# Patient Record
Sex: FEMALE | Race: WHITE | NOT HISPANIC OR LATINO | Employment: FULL TIME | URBAN - METROPOLITAN AREA
[De-identification: names, ages, dates, MRNs, and addresses within clinical notes are randomized per-mention and may not be internally consistent; named-entity substitution may affect disease eponyms.]

---

## 2017-01-13 ENCOUNTER — OFFICE VISIT (OUTPATIENT)
Dept: FAMILY MEDICINE | Facility: CLINIC | Age: 37
End: 2017-01-13
Payer: COMMERCIAL

## 2017-01-13 VITALS
HEART RATE: 84 BPM | HEIGHT: 69 IN | BODY MASS INDEX: 21.27 KG/M2 | DIASTOLIC BLOOD PRESSURE: 58 MMHG | OXYGEN SATURATION: 98 % | TEMPERATURE: 98.2 F | WEIGHT: 143.6 LBS | SYSTOLIC BLOOD PRESSURE: 100 MMHG

## 2017-01-13 DIAGNOSIS — F41.9 ANXIETY: ICD-10-CM

## 2017-01-13 DIAGNOSIS — Z00.00 ENCOUNTER FOR ROUTINE ADULT HEALTH EXAMINATION WITHOUT ABNORMAL FINDINGS: Primary | ICD-10-CM

## 2017-01-13 DIAGNOSIS — Z30.431 SURVEILLANCE OF PREVIOUSLY PRESCRIBED INTRAUTERINE CONTRACEPTIVE DEVICE: ICD-10-CM

## 2017-01-13 LAB
MICRO REPORT STATUS: NORMAL
SPECIMEN SOURCE: NORMAL
WET PREP SPEC: NORMAL

## 2017-01-13 PROCEDURE — 99395 PREV VISIT EST AGE 18-39: CPT | Performed by: FAMILY MEDICINE

## 2017-01-13 PROCEDURE — 87591 N.GONORRHOEAE DNA AMP PROB: CPT | Performed by: FAMILY MEDICINE

## 2017-01-13 PROCEDURE — 87491 CHLMYD TRACH DNA AMP PROBE: CPT | Performed by: FAMILY MEDICINE

## 2017-01-13 PROCEDURE — 87210 SMEAR WET MOUNT SALINE/INK: CPT | Performed by: FAMILY MEDICINE

## 2017-01-13 NOTE — PATIENT INSTRUCTIONS
Resume Prozac     Use saline spray for nasal symptoms.     Follow up for fasting lab-only appointment.     Preventive Health Recommendations  Female Ages 26 - 39  Yearly exam:   See your health care provider every year in order to    Review health changes.     Discuss preventive care.      Review your medicines if you your doctor has prescribed any.    Until age 30: Get a Pap test every three years (more often if you have had an abnormal result).    After age 30: Talk to your doctor about whether you should have a Pap test every 3 years or have a Pap test with HPV screening every 5 years.   You do not need a Pap test if your uterus was removed (hysterectomy) and you have not had cancer.  You should be tested each year for STDs (sexually transmitted diseases), if you're at risk.   Talk to your provider about how often to have your cholesterol checked.  If you are at risk for diabetes, you should have a diabetes test (fasting glucose).  Shots: Get a flu shot each year. Get a tetanus shot every 10 years.   Nutrition:     Eat at least 5 servings of fruits and vegetables each day.    Eat whole-grain bread, whole-wheat pasta and brown rice instead of white grains and rice.    Talk to your provider about Calcium and Vitamin D.     Lifestyle    Exercise at least 150 minutes a week (30 minutes a day, 5 days of the week). This will help you control your weight and prevent disease.    Limit alcohol to one drink per day.    No smoking.     Wear sunscreen to prevent skin cancer.    See your dentist every six months for an exam and cleaning.

## 2017-01-13 NOTE — NURSING NOTE
"Chief Complaint   Patient presents with     Physical       Initial /58 mmHg  Pulse 84  Temp(Src) 98.2  F (36.8  C) (Oral)  Ht 1.74 m (5' 8.5\")  Wt 65.137 kg (143 lb 9.6 oz)  BMI 21.51 kg/m2  SpO2 98%  Breastfeeding? No Estimated body mass index is 21.51 kg/(m^2) as calculated from the following:    Height as of this encounter: 1.74 m (5' 8.5\").    Weight as of this encounter: 65.137 kg (143 lb 9.6 oz).  BP completed using cuff size: lizett Julio MA      "

## 2017-01-13 NOTE — MR AVS SNAPSHOT
After Visit Summary   1/13/2017    Ruiz Deng    MRN: [id number]           Patient Information     Date Of Birth          1980        Visit Information        Provider Department      1/13/2017 11:20 AM Lida Navarrete MD [facility]        Today's Diagnoses     Encounter for routine adult health examination without abnormal findings    -  1     IUD Surveillance         Anxiety           Care Instructions    Resume Prozac     Use saline spray for nasal symptoms.     Follow up for fasting lab-only appointment.     Preventive Health Recommendations  Female Ages 26 - 39  Yearly exam:   See your health care provider every year in order to    Review health changes.     Discuss preventive care.      Review your medicines if you your doctor has prescribed any.    Until age 30: Get a Pap test every three years (more often if you have had an abnormal result).    After age 30: Talk to your doctor about whether you should have a Pap test every 3 years or have a Pap test with HPV screening every 5 years.   You do not need a Pap test if your uterus was removed (hysterectomy) and you have not had cancer.  You should be tested each year for STDs (sexually transmitted diseases), if you're at risk.   Talk to your provider about how often to have your cholesterol checked.  If you are at risk for diabetes, you should have a diabetes test (fasting glucose).  Shots: Get a flu shot each year. Get a tetanus shot every 10 years.   Nutrition:     Eat at least 5 servings of fruits and vegetables each day.    Eat whole-grain bread, whole-wheat pasta and brown rice instead of white grains and rice.    Talk to your provider about Calcium and Vitamin D.     Lifestyle    Exercise at least 150 minutes a week (30 minutes a day, 5 days of the week). This will help you control your weight and prevent disease.    Limit alcohol to one drink per day.    No smoking.     Wear sunscreen to prevent skin  "cancer.    See your dentist every six months for an exam and cleaning.            Follow-ups after your visit        Future tests that were ordered for you today     Open Future Orders        Priority Expected Expires Ordered    HIV Antigen Antibody Combo Routine  7/13/2017 1/13/2017    Hepatitis C antibody Routine  7/13/2017 1/13/2017    Anti Treponema Routine  7/13/2017 1/13/2017    Lipid panel reflex to direct LDL Routine  7/13/2017 1/13/2017    Glucose Routine  7/13/2017 1/13/2017            Who to contact     If you have questions or need follow up information about today's clinic visit or your schedule please contact [facility] directly at [phone].  Normal or non-critical lab and imaging results will be communicated to you by [employer] [facility], letter or phone within 4 business days after the clinic has received the results. If you do not hear from us within 7 days, please contact the clinic through [employer] or phone. If you have a critical or abnormal lab result, we will notify you by phone as soon as possible.  Submit refill requests through [employer] or call your pharmacy and they will forward the refill request to us. Please allow 3 business days for your refill to be completed.          Additional Information About Your Visit        [facility] [facility] Information     [employer] gives you secure access to your electronic health record. If you see a primary care provider, you can also send messages to your care team and make appointments. If you have questions, please call your primary care clinic.  If you do not have a primary care provider, please call [phone] and they will assist you.        Care EveryWhere ID     This is your Care EveryWhere ID. This could be used by other organizations to access your [place] medical records  [id number]        Your Vitals Were     Pulse Temperature Height BMI (Body Mass Index) Pulse Oximetry Breastfeeding?    84 98.2  F (36.8  C) (Oral) 1.74 m (5' 8.5\") 21.51 kg/m2 " 98% No       Blood Pressure from Last 3 Encounters:   01/13/17 100/58   10/03/16 120/80   12/29/15 96/82    Weight from Last 3 Encounters:   01/13/17 65.137 kg (143 lb 9.6 oz)   10/03/16 70.58 kg (155 lb 9.6 oz)   12/29/15 67.994 kg (149 lb 14.4 oz)              We Performed the Following     Chlamydia trachomatis PCR     Neisseria gonorrhoeae PCR     Wet prep          Today's Medication Changes          These changes are accurate as of: 1/13/17 12:09 PM.  If you have any questions, ask your nurse or doctor.               Stop taking these medicines if you haven't already. Please contact your care team if you have questions.     albuterol 108 (90 BASE) MCG/ACT Inhaler   Commonly known as:  PROAIR HFA/PROVENTIL HFA/VENTOLIN HFA   Stopped by:  Lida Navarrete MD           azithromycin 250 MG tablet   Commonly known as:  ZITHROMAX   Stopped by:  Lida Navarrete MD                    Primary Care Provider Office Phone # Fax #    Lida Navarrete [phone]       [address] [address]        Thank you!     Thank you for choosing [facility]  for your care. Our goal is always to provide you with excellent care. Hearing back from our patients is one way we can continue to improve our services. Please take a few minutes to complete the written survey that you may receive in the mail after your visit with us. Thank you!             Your Updated Medication List - Protect others around you: Learn how to safely use, store and throw away your medicines at www.disposemymeds.org.          This list is accurate as of: 1/13/17 12:09 PM.  Always use your most recent med list.                   Brand Name Dispense Instructions for use    FLUoxetine 20 MG capsule    PROzac    90 capsule    TAKE 1 CAPSULE (20 MG) BY MOUTH DAILY       levonorgestrel 20 MCG/24HR IUD    MIRENA    1 each    1 each (20 mcg) by Intrauterine route once for  1 dose       LORazepam 1 MG tablet    ATIVAN    4 tablet    Take 0.5-1 tablets (0.5-1 mg) by mouth every 8 hours as needed for anxiety Take 30 minutes prior to departure.  Do not operate a vehicle after taking this medication

## 2017-01-13 NOTE — PROGRESS NOTES
SUBJECTIVE:     CC: Ruiz Deng is an 36 year old woman who presents for preventive health visit.     Healthy Habits:    Do you get at least three servings of calcium containing foods daily (dairy, green leafy vegetables, etc.)? yes    Amount of exercise or daily activities, outside of work: 7 day(s) per week    Problems taking medications regularly No    Medication side effects: No    Have you had an eye exam in the past two years? yes    Do you see a dentist twice per year? yes    Do you have sleep apnea, excessive snoring or daytime drowsiness?no          Today's PHQ-2 Score:   PHQ-2 ( 1999 Pf[employer]) 1/8/2017 12/29/2015   Q1: Little interest or pleasure in doing things - 0   Q2: Feeling down, depressed or hopeless - 0   PHQ-2 Score - 0   Little interest or pleasure in doing things Not at all -   Feeling down, depressed or hopeless Several days -   PHQ-2 Score 1 -       Abuse: Current or Past(Physical, Sexual or Emotional)- No  Do you feel safe in your environment - Yes    Social History   Substance Use Topics     Smoking status: Former Smoker     Smokeless tobacco: Never Used     Alcohol Use: Yes      Comment: 1 beer Q 2 weeks     The patient does not drink >3 drinks per day nor >7 drinks per week.    Recent Labs   Lab Test  12/12/12   0900   CHOL  177   HDL  69   LDL  98   TRIG  54   CHOLHDLRATIO  2.6       Reviewed orders with patient.  Reviewed health maintenance and updated orders accordingly - Yes    Ruiz d/c'd her Prozac a few months ago. She started working with a  in October and she states exercising with him helped and she felt she did not need it  She notes she is more irritable but when she was taking it, her irritability was improved. She is eating healthy and exercising frequently. She states she feels burnt out at work and is considering cutting back on her hours.     She states that she is no longer drinking as much. She states at her highest, she would feel bad about her drinking and  was drinking a bottle of wine and a few beers 4 days a week. She said she used it as coping mechanism but now she is using the gym as a coping mechanism.  Denies missing work, needing a drink first thing in the morning. However, she would drink more than 2 drinks at a time and feel guilty about her drinking. She states her  is helpful in keeping her accountable.     Additional Notes  -Her breathing issues and cold resolved. She states the inhaler was not helpful.   - She declines the flu vaccine.   -She notes that she has low back pain that she attributes to heavy lifting. She is stretching and using [employer] and might consider a chiropractor if this doesn't improve. She has discussed this with her  and they are working on stretches together.   Denies numbness, tingling, or pain going down to her legs.   -She is no longer doing fitness competitions.   -She saw there dermatologist in the summer and he removed her mole.   -She has not noticed her IUD coming out. She is no longer getting periods. She saw a gynecologist who switched out her IUD in summer 2015. She does not check her IUD string  -Ruiz got life insurance and she had her cholesterol checked last summer. It was normal.     Mammo Decision Support:  Mammogram not appropriate for this patient based on age.    Pertinent mammograms are reviewed under the imaging tab.  History of abnormal Pap smear:   Last 3 Pap Results:   PAP (no units)   Date Value   2015 NIL   2014 ASC-US*   2012 NIL     All Histories reviewed and updated in Epic.  Past Medical History   Diagnosis Date     Abnormal Pap smear 2010     ASCUS negative HPV.  repeat pap in 2011 is NIL and per protocol, patient may resume routine screening.      CARDIOVASCULAR SCREENING; LDL GOAL LESS THAN 160 10/31/2010     Anxiety 2012     ASCUS favoring benign 2010     neg HPV plan cotest in 3 yrs.      Obstetric History       T2      TAB0   SAB0   " E0   M0   L2       # Outcome Date GA Lbr Jim/2nd Weight Sex Delivery Anes PTL Lv   2 Term            1 Term                 Patient Active Problem List   Diagnosis     IUD Surveillance     CARDIOVASCULAR SCREENING; LDL GOAL LESS THAN 160     Skin blushing/flushing     Anxiety     Decreased libido     Past Surgical History   Procedure Laterality Date     C appendectomy  2006     open     Tonsillectomy  2002     Reconstruct breast, implant prosthesis, combined  2011     Surgical history of -   2014     mucocele removal       Social History   Substance Use Topics     Smoking status: Former Smoker     Smokeless tobacco: Never Used     Alcohol Use: Yes      Comment: 1 beer Q 2 weeks     Family History   Problem Relation Age of Onset     Asthma No family hx of      C.A.D. Paternal Grandfather 55      age 58 from 2nd MI     DIABETES Maternal Grandfather      Hypertension No family hx of      CEREBROVASCULAR DISEASE No family hx of      Breast Cancer No family hx of      Cancer - colorectal No family hx of      Prostate Cancer No family hx of      Alcoholism Father      Lung Cancer Paternal Grandmother 85     smoker         Current Outpatient Prescriptions   Medication Sig Dispense Refill     LORazepam (ATIVAN) 1 MG tablet Take 0.5-1 tablets (0.5-1 mg) by mouth every 8 hours as needed for anxiety Take 30 minutes prior to departure.  Do not operate a vehicle after taking this medication 4 tablet 0     FLUoxetine (PROZAC) 20 MG capsule TAKE 1 CAPSULE (20 MG) BY MOUTH DAILY 90 capsule 3     levonorgestrel (MIRENA) 20 MCG/24HR IUD 1 each (20 mcg) by Intrauterine route once for 1 dose 1 each 0     No Known Allergies      ROS:   ROS: 10 point ROS neg other than the symptoms noted above in the HPI.        OBJECTIVE:     /58 mmHg  Pulse 84  Temp(Src) 98.2  F (36.8  C) (Oral)  Ht 1.74 m (5' 8.5\")  Wt 65.137 kg (143 lb 9.6 oz)  BMI 21.51 kg/m2  SpO2 98%  Breastfeeding? No  EXAM:  GENERAL: healthy, alert and no " distress  EYES: Eyes grossly normal to inspection, PERRL and conjunctivae and sclerae normal  HENT: ear canals and TM's normal, nasal mucosal edema-right nostril, mouth without ulcers or lesions  NECK: no adenopathy, no asymmetry, masses, or scars and thyroid normal to palpation  RESP: lungs clear to auscultation - no rales, rhonchi or wheezes  BREAST: normal without masses, tenderness or nipple discharge and no palpable axillary masses or adenopathy  CV: regular rate and rhythm, normal S1 S2, no S3 or S4, no murmur, click or rub, no peripheral edema and peripheral pulses strong  ABDOMEN: soft, nontender, no hepatosplenomegaly, no masses and bowel sounds normal   (female): normal female external genitalia, normal urethral meatus, vaginal mucosa pink, moist, well rugated, and normal cervix/adnexa/uterus without masses or discharge. I'm unable to visualize the iud string.   MS: no gross musculoskeletal defects noted, no edema  SKIN: no suspicious lesions or rashes  NEURO: Normal strength and tone, mentation intact and speech normal  PSYCH: mentation appears normal, affect normal/bright    ASSESSMENT/PLAN:     1. Encounter for routine adult health examination without abnormal findings  Declined flu vaccine. Patient understands the risks of not vaccinating  - Wet prep  - Neisseria gonorrhoeae PCR  - Chlamydia trachomatis PCR  - HIV Antigen Antibody Combo; Future  - Hepatitis C antibody; Future  - Anti Treponema; Future  - Lipid panel reflex to direct LDL; Future  - Glucose; Future    2. IUD Surveillance  Not able to visualize iud string. Pt declines ultrasound/gyn referral to confirm presence. Will monitor.     3. Anxiety  Off Prozac. Pt is generally controlling with exercise but ongoing irritability that was better controlled with meds.  encouraged restarting Prozac for optimal control of sx's.   Reviewed onset of action of meds, common side effects and plan for close f/u. Encouraged call to clinic if symptoms  "worsening or adverse reactions.      COUNSELING:   Reviewed preventive health counseling, as reflected in patient instructions       Regular exercise       Healthy diet/nutrition       Vision screening       Hearing screening       Alcohol Use       Contraception         reports that she has quit smoking. She has never used smokeless tobacco.    Estimated body mass index is 21.51 kg/(m^2) as calculated from the following:    Height as of this encounter: 1.74 m (5' 8.5\").    Weight as of this encounter: 65.137 kg (143 lb 9.6 oz).     Patient Instructions   Resume Prozac     Use saline spray for nasal symptoms.     Follow up for fasting lab-only appointment.     Preventive Health Recommendations  Female Ages 26 - 39  Yearly exam:   See your health care provider every year in order to    Review health changes.     Discuss preventive care.      Review your medicines if you your doctor has prescribed any.    Until age 30: Get a Pap test every three years (more often if you have had an abnormal result).    After age 30: Talk to your doctor about whether you should have a Pap test every 3 years or have a Pap test with HPV screening every 5 years.   You do not need a Pap test if your uterus was removed (hysterectomy) and you have not had cancer.  You should be tested each year for STDs (sexually transmitted diseases), if you're at risk.   Talk to your provider about how often to have your cholesterol checked.  If you are at risk for diabetes, you should have a diabetes test (fasting glucose).  Shots: Get a flu shot each year. Get a tetanus shot every 10 years.   Nutrition:     Eat at least 5 servings of fruits and vegetables each day.    Eat whole-grain bread, whole-wheat pasta and brown rice instead of white grains and rice.    Talk to your provider about Calcium and Vitamin D.     Lifestyle    Exercise at least 150 minutes a week (30 minutes a day, 5 days of the week). This will help you control your weight and prevent " disease.    Limit alcohol to one drink per day.    No smoking.     Wear sunscreen to prevent skin cancer.    See your dentist every six months for an exam and cleaning.                Counseling Resources:  ATP IV Guidelines  Pooled Cohorts Equation Calculator  Breast Cancer Risk Calculator  FRAX Risk Assessment  ICSI Preventive Guidelines  Dietary Guidelines for Americans, 2010  USDA's MyPlate  ASA Prophylaxis  Lung CA Screening    This document serves as a record of the services and decisions personally performed and made by Lida Navarrete MD. It was created on her behalf by Haritha Whaley,a trained medical scribe. The creation of this document is based the provider's statements to the medical scribe.  Haritha Whaley January 13, 2017 11:37 AM     Lida Navarrete MD  [facility]  Answers for HPI/ROS submitted by the patient on 1/8/2017   Annual Exam:  Getting at least 3 servings of Calcium per day:: Yes  Bi-annual eye exam:: Yes  Dental care twice a year:: Yes  Sleep apnea or symptoms of sleep apnea:: None  Diet:: Regular (no restrictions)  Frequency of exercise:: 6-7 days/week  Taking medications regularly:: Yes  Medication side effects:: Not applicable  Additional concerns today:: No  PHQ-2 Depressed: Not at all, Several days  PHQ-2 Score: 1

## 2017-01-15 LAB
C TRACH DNA SPEC QL NAA+PROBE: NORMAL
N GONORRHOEA DNA SPEC QL NAA+PROBE: NORMAL
SPECIMEN SOURCE: NORMAL
SPECIMEN SOURCE: NORMAL

## 2017-01-17 DIAGNOSIS — Z00.00 ENCOUNTER FOR ROUTINE ADULT HEALTH EXAMINATION WITHOUT ABNORMAL FINDINGS: ICD-10-CM

## 2017-01-17 LAB
CHOLEST SERPL-MCNC: 152 MG/DL
GLUCOSE SERPL-MCNC: 83 MG/DL (ref 70–99)
HDLC SERPL-MCNC: 76 MG/DL
LDLC SERPL CALC-MCNC: 66 MG/DL
NONHDLC SERPL-MCNC: 76 MG/DL
T PALLIDUM IGG+IGM SER QL: NEGATIVE
TRIGL SERPL-MCNC: 48 MG/DL

## 2017-01-17 PROCEDURE — 82947 ASSAY GLUCOSE BLOOD QUANT: CPT | Performed by: FAMILY MEDICINE

## 2017-01-17 PROCEDURE — 80061 LIPID PANEL: CPT | Performed by: FAMILY MEDICINE

## 2017-01-17 PROCEDURE — 36415 COLL VENOUS BLD VENIPUNCTURE: CPT | Performed by: FAMILY MEDICINE

## 2017-01-17 PROCEDURE — 86803 HEPATITIS C AB TEST: CPT | Performed by: FAMILY MEDICINE

## 2017-01-17 PROCEDURE — 86780 TREPONEMA PALLIDUM: CPT | Performed by: FAMILY MEDICINE

## 2017-01-17 PROCEDURE — 87389 HIV-1 AG W/HIV-1&-2 AB AG IA: CPT | Performed by: FAMILY MEDICINE

## 2017-01-18 LAB
HCV AB SERPL QL IA: NORMAL
HIV 1+2 AB+HIV1 P24 AG SERPL QL IA: NORMAL

## 2017-06-03 ENCOUNTER — MYC MEDICAL ADVICE (OUTPATIENT)
Dept: FAMILY MEDICINE | Facility: CLINIC | Age: 37
End: 2017-06-03

## 2017-06-03 DIAGNOSIS — F40.243 FEAR OF FLYING: ICD-10-CM

## 2017-06-03 DIAGNOSIS — F41.9 ANXIETY: ICD-10-CM

## 2017-06-05 RX ORDER — LORAZEPAM 1 MG/1
0.5-1 TABLET ORAL EVERY 8 HOURS PRN
Qty: 6 TABLET | Refills: 0 | Status: SHIPPED | OUTPATIENT
Start: 2017-06-05 | End: 2018-03-08

## 2017-07-07 ENCOUNTER — MYC MEDICAL ADVICE (OUTPATIENT)
Dept: FAMILY MEDICINE | Facility: CLINIC | Age: 37
End: 2017-07-07

## 2017-07-07 DIAGNOSIS — F41.9 ANXIETY: ICD-10-CM

## 2017-07-07 NOTE — TELEPHONE ENCOUNTER
Prescription approved per [facility] Refill Protocol.  Ana Rosa Caputo RN.

## 2017-07-07 NOTE — TELEPHONE ENCOUNTER
FLUoxetine (PROZAC) 20 MG capsule     Last Written Prescription Date: 04/21/16  Last Fill Quantity: 90, # refills: 3  [place] Visit with [facility] [place]:  01/13/17 Dr. Navarrete        Last PHQ-9 score on record=   PHQ-9 SCORE 3/24/2015   Total Score 0

## 2017-12-07 ENCOUNTER — OFFICE VISIT (OUTPATIENT)
Dept: FAMILY MEDICINE | Facility: CLINIC | Age: 37
End: 2017-12-07
Payer: COMMERCIAL

## 2017-12-07 VITALS
DIASTOLIC BLOOD PRESSURE: 74 MMHG | TEMPERATURE: 98.5 F | HEART RATE: 64 BPM | WEIGHT: 151 LBS | BODY MASS INDEX: 22.88 KG/M2 | OXYGEN SATURATION: 99 % | SYSTOLIC BLOOD PRESSURE: 118 MMHG | HEIGHT: 68 IN

## 2017-12-07 DIAGNOSIS — Z00.00 ENCOUNTER FOR ROUTINE ADULT HEALTH EXAMINATION WITHOUT ABNORMAL FINDINGS: Primary | ICD-10-CM

## 2017-12-07 DIAGNOSIS — F41.9 ANXIETY: ICD-10-CM

## 2017-12-07 DIAGNOSIS — Z30.431 SURVEILLANCE OF PREVIOUSLY PRESCRIBED INTRAUTERINE CONTRACEPTIVE DEVICE: ICD-10-CM

## 2017-12-07 LAB
SPECIMEN SOURCE: NORMAL
WET PREP SPEC: NORMAL

## 2017-12-07 PROCEDURE — 99395 PREV VISIT EST AGE 18-39: CPT | Performed by: FAMILY MEDICINE

## 2017-12-07 PROCEDURE — 87491 CHLMYD TRACH DNA AMP PROBE: CPT | Performed by: FAMILY MEDICINE

## 2017-12-07 PROCEDURE — 87591 N.GONORRHOEAE DNA AMP PROB: CPT | Performed by: FAMILY MEDICINE

## 2017-12-07 PROCEDURE — 87210 SMEAR WET MOUNT SALINE/INK: CPT | Performed by: FAMILY MEDICINE

## 2017-12-07 ASSESSMENT — PAIN SCALES - GENERAL: PAINLEVEL: NO PAIN (0)

## 2017-12-07 NOTE — MR AVS SNAPSHOT
After Visit Summary   12/7/2017    Ruiz Deng    MRN: [id number]           Patient Information     Date Of Birth          1980        Visit Information        Provider Department      12/7/2017 2:40 PM Lida Navarrete MD [facility]        Today's Diagnoses     Encounter for routine adult health examination without abnormal findings    -  1    IUD Surveillance        Anxiety          Care Instructions    Med check in six months    Preventive Health Recommendations  Female Ages 26 - 39  Yearly exam:   See your health care provider every year in order to    Review health changes.     Discuss preventive care.      Review your medicines if you your doctor has prescribed any.    Until age 30: Get a Pap test every three years (more often if you have had an abnormal result).    After age 30: Talk to your doctor about whether you should have a Pap test every 3 years or have a Pap test with HPV screening every 5 years.   You do not need a Pap test if your uterus was removed (hysterectomy) and you have not had cancer.  You should be tested each year for STDs (sexually transmitted diseases), if you're at risk.   Talk to your provider about how often to have your cholesterol checked.  If you are at risk for diabetes, you should have a diabetes test (fasting glucose).  Shots: Get a flu shot each year. Get a tetanus shot every 10 years.   Nutrition:     Eat at least 5 servings of fruits and vegetables each day.    Eat whole-grain bread, whole-wheat pasta and brown rice instead of white grains and rice.    Talk to your provider about Calcium and Vitamin D.     Lifestyle    Exercise at least 150 minutes a week (30 minutes a day, 5 days of the week). This will help you control your weight and prevent disease.    Limit alcohol to one drink per day.    No smoking.     Wear sunscreen to prevent skin cancer.    See your dentist every six months for an exam and cleaning.             "Follow-ups after your visit        Who to contact     If you have questions or need follow up information about today's clinic visit or your schedule please contact [facility] directly at [phone].  Normal or non-critical lab and imaging results will be communicated to you by MyChart, letter or phone within 4 business days after the clinic has received the results. If you do not hear from us within 7 days, please contact the clinic through MyChart or phone. If you have a critical or abnormal lab result, we will notify you by phone as soon as possible.  Submit refill requests through [employer] or call your pharmacy and they will forward the refill request to us. Please allow 3 business days for your refill to be completed.          Additional Information About Your Visit        [facility] Information     [employer] gives you secure access to your electronic health record. If you see a primary care provider, you can also send messages to your care team and make appointments. If you have questions, please call your primary care clinic.  If you do not have a primary care provider, please call [phone] and they will assist you.        Care EveryWhere ID     This is your Care EveryWhere ID. This could be used by other organizations to access your [place] medical records  [id number]        Your Vitals Were     Pulse Temperature Height Pulse Oximetry Breastfeeding? BMI (Body Mass Index)    64 98.5  F (36.9  C) (Oral) 1.715 m (5' 7.5\") 99% No 23.3 kg/m2       Blood Pressure from Last 3 Encounters:   12/07/17 118/74   01/13/17 100/58   10/03/16 120/80    Weight from Last 3 Encounters:   12/07/17 68.5 kg (151 lb)   01/13/17 65.1 kg (143 lb 9.6 oz)   10/03/16 70.6 kg (155 lb 9.6 oz)              We Performed the Following     Chlamydia trachomatis PCR     Neisseria gonorrhoeae PCR     Wet prep          Today's Medication Changes          These changes are accurate as of: 12/7/17  3:29 PM.  If you have any " questions, ask your nurse or doctor.               These medicines have changed or have updated prescriptions.        Dose/Directions    FLUoxetine 20 MG capsule   Commonly known as:  PROzac   This may have changed:  See the new instructions.   Used for:  Anxiety   Changed by:  Lida Navarrete MD        TAKE 1 CAP BY MOUTH DAILY   Quantity:  90 capsule   Refills:  3            Where to get your medicines      These medications were sent to [facility]/pharmacy #[id number] - [place], [place] - [address] [place] [facility]  [address] [facility] [address]     Phone:  [phone]     FLUoxetine 20 MG capsule                Primary Care Provider Office Phone # Fax #    Lida Navarrete [phone] [address] [address]        Equal Access to Services     [facility]: Hadii aad ku hadasho Soandrea, waaxda luqadaha, qaybta kaalmada ademaiteyaamaris, morgan barone . So [facility] [phone].    ATENCIÓN: Si habla español, tiene a gutierrez disposición servicios gratuitos de asistencia lingüística. [facility] [phone].    We comply with applicable federal civil rights laws and [place] laws. We do not discriminate on the basis of race, color, national origin, age, disability, sex, sexual orientation, or gender identity.            Thank you!     Thank you for choosing [facility]  for your care. Our goal is always to provide you with excellent care. Hearing back from our patients is one way we can continue to improve our services. Please take a few minutes to complete the written survey that you may receive in the mail after your visit with us. Thank you!             Your Updated Medication List - Protect others around you: Learn how to safely use, store and throw away your medicines at www.disposemymeds.org.          This list is accurate as of: 12/7/17  3:29 PM.  Always use your most recent med list.                    Brand Name Dispense Instructions for use Diagnosis    FLUoxetine 20 MG capsule    PROzac    90 capsule    TAKE 1 CAP BY MOUTH DAILY    Anxiety       levonorgestrel 20 MCG/24HR IUD    MIRENA    1 each    1 each (20 mcg) by Intrauterine route once for 1 dose    Encounter for IUD insertion       LORazepam 1 MG tablet    ATIVAN    6 tablet    Take 0.5-1 tablets (0.5-1 mg) by mouth every 8 hours as needed for anxiety Take 30 minutes prior to departure.  Do not operate a vehicle after taking this medication    Fear of flying, Anxiety

## 2017-12-07 NOTE — PATIENT INSTRUCTIONS
Med check in six months    Preventive Health Recommendations  Female Ages 26 - 39  Yearly exam:   See your health care provider every year in order to    Review health changes.     Discuss preventive care.      Review your medicines if you your doctor has prescribed any.    Until age 30: Get a Pap test every three years (more often if you have had an abnormal result).    After age 30: Talk to your doctor about whether you should have a Pap test every 3 years or have a Pap test with HPV screening every 5 years.   You do not need a Pap test if your uterus was removed (hysterectomy) and you have not had cancer.  You should be tested each year for STDs (sexually transmitted diseases), if you're at risk.   Talk to your provider about how often to have your cholesterol checked.  If you are at risk for diabetes, you should have a diabetes test (fasting glucose).  Shots: Get a flu shot each year. Get a tetanus shot every 10 years.   Nutrition:     Eat at least 5 servings of fruits and vegetables each day.    Eat whole-grain bread, whole-wheat pasta and brown rice instead of white grains and rice.    Talk to your provider about Calcium and Vitamin D.     Lifestyle    Exercise at least 150 minutes a week (30 minutes a day, 5 days of the week). This will help you control your weight and prevent disease.    Limit alcohol to one drink per day.    No smoking.     Wear sunscreen to prevent skin cancer.    See your dentist every six months for an exam and cleaning.

## 2017-12-07 NOTE — NURSING NOTE
"Chief Complaint   Patient presents with     Physical       Initial /74  Pulse 64  Temp 98.5  F (36.9  C) (Oral)  Ht 1.715 m (5' 7.5\")  Wt 68.5 kg (151 lb)  SpO2 99%  Breastfeeding? No  BMI 23.3 kg/m2 Estimated body mass index is 23.3 kg/(m^2) as calculated from the following:    Height as of this encounter: 1.715 m (5' 7.5\").    Weight as of this encounter: 68.5 kg (151 lb).  Medication Reconciliation: complete   Jan SANTIAGO        "

## 2017-12-07 NOTE — PROGRESS NOTES
SUBJECTIVE:   CC: Ruiz Deng is an 37 year old woman who presents for preventive health visit.     Healthy Habits:    Do you get at least three servings of calcium containing foods daily (dairy, green leafy vegetables, etc.)? yes    Amount of exercise or daily activities, outside of work: 3-4 day(s) per week    Problems taking medications regularly No    Medication side effects: No    Have you had an eye exam in the past two years? yes    Do you see a dentist twice per year? yes    Do you have sleep apnea, excessive snoring or daytime drowsiness?no    Mood: controlled with Prozac. She d/c'ed this summer and had mood sx's return so she restarted Prozac.     IUD: insertion date was June 2015. She is happy with this. No regular menses with iud in place     Weight: pt was down to 135 lbs when working with a . She is nervous about her weight. She is eating lots of fruits and veggies and protein.   Wt Readings from Last 5 Encounters:   12/07/17 68.5 kg (151 lb)   01/13/17 65.1 kg (143 lb 9.6 oz)   10/03/16 70.6 kg (155 lb 9.6 oz)   12/29/15 68 kg (149 lb 14.4 oz)   06/19/15 67.8 kg (149 lb 6.4 oz)     Pt's dad has hx of pancreatic cancer. Cancer has spread other organs and was told he has 6-8 months to live.     BP: no concerns.  BP Readings from Last 3 Encounters:   12/07/17 118/74   01/13/17 100/58   10/03/16 120/80         Today's PHQ-2 Score:   PHQ-2 ( 1999 [employer]) 1/8/2017 12/29/2015   Q1: Little interest or pleasure in doing things - 0   Q2: Feeling down, depressed or hopeless - 0   PHQ-2 Score - 0   Q1: Little interest or pleasure in doing things Not at all -   Q2: Feeling down, depressed or hopeless Several days -   PHQ-2 Score 1 -         Abuse: Current or Past(Physical, Sexual or Emotional)- No  Do you feel safe in your environment - Yes  Social History   Substance Use Topics     Smoking status: Former Smoker     Smokeless tobacco: Never Used     Alcohol use Yes      Comment: 2-3 drinks Q  weeks      The patient does not drink >3 drinks per day nor >7 drinks per week.    Reviewed orders with patient.  Reviewed health maintenance and updated orders accordingly - Yes  Labs reviewed in EPIC  BP Readings from Last 3 Encounters:   17 118/74   17 100/58   10/03/16 120/80    Wt Readings from Last 3 Encounters:   17 68.5 kg (151 lb)   17 65.1 kg (143 lb 9.6 oz)   10/03/16 70.6 kg (155 lb 9.6 oz)                  Patient Active Problem List   Diagnosis     IUD Surveillance     CARDIOVASCULAR SCREENING; LDL GOAL LESS THAN 160     Skin blushing/flushing     Anxiety     Decreased libido     Past Surgical History:   Procedure Laterality Date     C APPENDECTOMY  2006    open     RECONSTRUCT BREAST, IMPLANT PROSTHESIS, COMBINED  2011     SURGICAL HISTORY OF -   2014    mucocele removal     TONSILLECTOMY         Social History   Substance Use Topics     Smoking status: Former Smoker     Smokeless tobacco: Never Used     Alcohol use Yes      Comment: 2-3 drinks Q  weeks     Family History   Problem Relation Age of Onset     C.A.D. Paternal Grandfather 55      age 58 from 2nd MI     DIABETES Maternal Grandfather      Alcoholism Father      Pancreatic Cancer Father      Lung Cancer Paternal Grandmother 85     smoker     Asthma No family hx of      Hypertension No family hx of      CEREBROVASCULAR DISEASE No family hx of      Breast Cancer No family hx of      Cancer - colorectal No family hx of      Prostate Cancer No family hx of                Mammogram not appropriate for this patient based on age.    Pertinent mammograms are reviewed under the imaging tab.  History of abnormal Pap smear: NO - age 30- 65 PAP every 3 years recommended    Reviewed and updated as needed this visit by clinical staffTobacco  Allergies  Meds         Reviewed and updated as needed this visit by Provider Tobacco  Allergies  Meds  Med Hx  Surg Hx  Fam Hx  Soc Hx               ROS:   ROS: 10 point ROS neg  "other than the symptoms noted above in the HPI.    This document serves as a record of the services and decisions personally performed and made by Lida Navarrete MD. It was created on her behalf by Rosemarie Chamberlain, a trained medical scribe. The creation of this document is based the provider's statements to the medical scribe.  Rosemarie Chamberlain December 7, 2017 3:13 PM      OBJECTIVE:   /74  Pulse 64  Temp 98.5  F (36.9  C) (Oral)  Ht 1.715 m (5' 7.5\")  Wt 68.5 kg (151 lb)  SpO2 99%  Breastfeeding? No  BMI 23.3 kg/m2  EXAM:  GENERAL: healthy, alert and no distress  EYES: Eyes grossly normal to inspection, PERRL and conjunctivae and sclerae normal  HENT: ear canals and TM's normal, nose and mouth without ulcers or lesions  NECK: no adenopathy, no asymmetry, masses, or scars and thyroid normal to palpation  RESP: lungs clear to auscultation - no rales, rhonchi or wheezes  BREAST: normal without masses, tenderness or nipple discharge and no palpable axillary masses or adenopathy  CV: regular rate and rhythm, normal S1 S2, no S3 or S4, no murmur, click or rub, no peripheral edema and peripheral pulses strong  ABDOMEN: soft, nontender, no hepatosplenomegaly, no masses and bowel sounds normal   (female): normal female external genitalia, normal urethral meatus, vaginal mucosa pink, moist, well rugated, and normal cervix/adnexa/uterus without masses or discharge, IUD string present in os (difficult to see, but appears curled inside os)  MS: no gross musculoskeletal defects noted, no edema  SKIN: no suspicious lesions or rashes  NEURO: Normal strength and tone, mentation intact and speech normal  PSYCH: mentation appears normal, affect normal/bright      No results found for this or any previous visit (from the past 24 hour(s)).      Component      Latest Ref Rng & Units 1/13/2017 1/17/2017   Cholesterol      <200 mg/dL  152   Triglycerides      <150 mg/dL  48   HDL Cholesterol      >49 mg/dL  76 "   LDL Cholesterol Calculated      <100 mg/dL  66   Non HDL Cholesterol      <130 mg/dL  76   Specimen Descrip       Cervical    N Gonorrhea PCR      NEG Negative . . .    Specimen Description       Cervical    Chlamydia Trachomatis PCR      NEG Negative . . .    HIV Antigen Antibody Combo      NR  Nonreactive . . .   Hepatitis C Antibody      NR  Nonreactive . . .   Treponema pallidum Antibody      NEG  Negative     ASSESSMENT/PLAN:   1. Encounter for routine adult health examination without abnormal findings  - Wet prep  - Neisseria gonorrhoeae PCR  - Chlamydia trachomatis PCR  Declines flu vaccine-    2. IUD Surveillance  string was visible     3. Anxiety  Controlled. Continue same medication.   - FLUoxetine (PROZAC) 20 MG capsule; TAKE 1 CAP BY MOUTH DAILY  Dispense: 90 capsule; Refill: 3      Patient Instructions   Med check in six months    Preventive Health Recommendations  Female Ages 26 - 39  Yearly exam:   See your health care provider every year in order to    Review health changes.     Discuss preventive care.      Review your medicines if you your doctor has prescribed any.    Until age 30: Get a Pap test every three years (more often if you have had an abnormal result).    After age 30: Talk to your doctor about whether you should have a Pap test every 3 years or have a Pap test with HPV screening every 5 years.   You do not need a Pap test if your uterus was removed (hysterectomy) and you have not had cancer.  You should be tested each year for STDs (sexually transmitted diseases), if you're at risk.   Talk to your provider about how often to have your cholesterol checked.  If you are at risk for diabetes, you should have a diabetes test (fasting glucose).  Shots: Get a flu shot each year. Get a tetanus shot every 10 years.   Nutrition:     Eat at least 5 servings of fruits and vegetables each day.    Eat whole-grain bread, whole-wheat pasta and brown rice instead of white grains and rice.    Talk to  "your provider about Calcium and Vitamin D.     Lifestyle    Exercise at least 150 minutes a week (30 minutes a day, 5 days of the week). This will help you control your weight and prevent disease.    Limit alcohol to one drink per day.    No smoking.     Wear sunscreen to prevent skin cancer.    See your dentist every six months for an exam and cleaning.        COUNSELING:   Reviewed preventive health counseling, as reflected in patient instructions       Regular exercise       Healthy diet/nutrition       Vision screening       Hearing screening       Immunizations    Declined: Influenza due to Conscientious objector           Alcohol Use       Contraception           reports that she has quit smoking. She has never used smokeless tobacco.    Estimated body mass index is 23.3 kg/(m^2) as calculated from the following:    Height as of this encounter: 1.715 m (5' 7.5\").    Weight as of this encounter: 68.5 kg (151 lb).         Counseling Resources:  ATP IV Guidelines  Pooled Cohorts Equation Calculator  Breast Cancer Risk Calculator  FRAX Risk Assessment  ICSI Preventive Guidelines  Dietary Guidelines for Americans, 2010  USDA's MyPlate  ASA Prophylaxis  Lung CA Screening    The information in this document, created by the medical scribe for me, accurately reflects the services I personally performed and the decisions made by me. I have reviewed and approved this document for accuracy.   MD Lida Cooper MD  [facility]  "

## 2017-12-08 LAB
C TRACH DNA SPEC QL NAA+PROBE: NEGATIVE
N GONORRHOEA DNA SPEC QL NAA+PROBE: NEGATIVE
SPECIMEN SOURCE: NORMAL
SPECIMEN SOURCE: NORMAL

## 2018-03-08 ENCOUNTER — MYC MEDICAL ADVICE (OUTPATIENT)
Dept: FAMILY MEDICINE | Facility: CLINIC | Age: 38
End: 2018-03-08

## 2018-03-08 DIAGNOSIS — F40.243 FEAR OF FLYING: ICD-10-CM

## 2018-03-08 DIAGNOSIS — F41.9 ANXIETY: ICD-10-CM

## 2018-03-08 RX ORDER — LORAZEPAM 1 MG/1
0.5-1 TABLET ORAL EVERY 8 HOURS PRN
Qty: 6 TABLET | Refills: 0 | Status: SHIPPED | OUTPATIENT
Start: 2018-03-08 | End: 2020-01-02

## 2018-03-08 NOTE — TELEPHONE ENCOUNTER
Requested Prescriptions   Pending Prescriptions Disp Refills     LORazepam (ATIVAN) 1 MG tablet 6 tablet 0     Sig: Take 0.5-1 tablets (0.5-1 mg) by mouth every 8 hours as needed for anxiety Take 30 minutes prior to departure.  Do not operate a vehicle after taking this medication    There is no refill protocol information for this order        Routing refill request to provider for review/approval because:  Drug not on the [facility] refill protocol     Drew Pino RN, BSN

## 2018-04-27 ENCOUNTER — MYC MEDICAL ADVICE (OUTPATIENT)
Dept: FAMILY MEDICINE | Facility: CLINIC | Age: 38
End: 2018-04-27

## 2018-04-27 NOTE — TELEPHONE ENCOUNTER
Sent patient [email or web address] message requesting that she reach out to pharmacy to see if any refills are left with most recent Rx.    Pilar Terrell RN

## 2018-04-30 NOTE — TELEPHONE ENCOUNTER
Per chart review, patient read last [employer] message from [facility] that instructed patient to call pharmacy and directly speak with someone about medication refill as she should have enough medication on file until December 2018. Instructed patient to call back with any further questions or concerns, in [employer] message sent on 4/27/18. Closing encounter.        Pilar Terrell RN

## 2018-12-13 ENCOUNTER — OFFICE VISIT (OUTPATIENT)
Dept: DERMATOLOGY | Facility: CLINIC | Age: 38
End: 2018-12-13
Payer: COMMERCIAL

## 2018-12-13 DIAGNOSIS — L81.4 SOLAR LENTIGO: Primary | ICD-10-CM

## 2018-12-13 DIAGNOSIS — L57.8 SUN-DAMAGED SKIN: ICD-10-CM

## 2018-12-13 DIAGNOSIS — D22.9 MULTIPLE BENIGN NEVI: ICD-10-CM

## 2018-12-13 DIAGNOSIS — Z86.018 HISTORY OF DYSPLASTIC NEVUS: ICD-10-CM

## 2018-12-13 PROCEDURE — 99214 OFFICE O/P EST MOD 30 MIN: CPT | Performed by: DERMATOLOGY

## 2018-12-13 NOTE — LETTER
12/13/2018         RE: Ruiz Deng  [id number]  [facility] [address]        Dear Colleague,    Thank you for referring your patient, Ruiz Deng, to the [facility]. Please see a copy of my visit note below.    [facility] Dermatology Note      Dermatology Problem List:  1. Junctional dysplastic nevus with moderate atypia, right medial thigh, s/p biopsy 8/23/2016    Encounter Date: Dec 13, 2018    CC:  Chief Complaint   Patient presents with     Derm Problem     Pt states being for a full skin check on today visit but nothing on specific         History of Present Illness:  Ms. Ruiz Deng is a 38 year old female who presents in self referral for a skin check. She was last seen by Dr. Leary on 8/23/2016 when a biopsy was done on her right medial thigh (DN with moderate atypia). Today she has no areas of concern. She does try to watch her moles for changes. No other concerns addressed today.      Past Medical History:   Patient Active Problem List   Diagnosis     IUD Surveillance     CARDIOVASCULAR SCREENING; LDL GOAL LESS THAN 160     Skin blushing/flushing     Anxiety     Decreased libido     Past Medical History:   Diagnosis Date     Abnormal Pap smear 9/17/2010    ASCUS negative HPV.  repeat pap in 9/2011 is NIL and per protocol, patient may resume routine screening.      Anxiety 6/22/2012     ASCUS favoring benign 12/16/14, 2010    neg HPV plan cotest in 3 yrs.     CARDIOVASCULAR SCREENING; LDL GOAL LESS THAN 160 10/31/2010     Past Surgical History:   Procedure Laterality Date     C APPENDECTOMY  2006    open     RECONSTRUCT BREAST, IMPLANT PROSTHESIS, COMBINED  5/ 2011     SURGICAL HISTORY OF -   9/2014    mucocele removal     TONSILLECTOMY  2002       Social History:  Patient reports that she has quit smoking. she has never used smokeless tobacco. She reports that she drinks alcohol. She reports that she does not use drugs. Patient admits to  previous tanning bed usage. 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    Family History:  Family History   Problem Relation Age of Onset     C.A.D. Paternal Grandfather 55         age 58 from 2nd MI     Diabetes Maternal Grandfather      Alcoholism Father      Pancreatic Cancer Father      Lung Cancer Paternal Grandmother 85        smoker     Asthma No family hx of      Hypertension No family hx of      Cerebrovascular Disease No family hx of      Breast Cancer No family hx of      Cancer - colorectal No family hx of      Prostate Cancer No family hx of        Medications:  Current Outpatient Medications   Medication Sig Dispense Refill     FLUoxetine (PROZAC) 20 MG capsule TAKE 1 CAP BY MOUTH DAILY 90 capsule 3     levonorgestrel (MIRENA) 20 MCG/24HR IUD 1 each (20 mcg) by Intrauterine route once for 1 dose 1 each 0     LORazepam (ATIVAN) 1 MG tablet Take 0.5-1 tablets (0.5-1 mg) by mouth every 8 hours as needed for anxiety Take 30 minutes prior to departure.  Do not operate a vehicle after taking this medication (Patient not taking: Reported on 2018) 6 tablet 0       No Known Allergies    Review of Systems:  -Constitutional: Patient is otherwise feeling well, in usual state of health.   -Skin: As above in HPI. No additional skin concerns.    Physical exam:  Vitals: There were no vitals taken for this visit.  GEN: This is a well developed, well-nourished female in no acute distress, in a pleasant mood.    SKIN: Total skin excluding the undergarment areas was performed. The exam included the head/face, neck, both arms, chest, back, abdomen, both legs, digits and/or nails and buttocks.  - Burnett Type II  - Scattered brown macules on sun exposed areas.  - Multiple regular brown pigmented macules and papules are identified on the trunk.   - well healed scar on the right posterior thigh, no repigmentation  - many nevi on the feet, including:  3 brown macules on right plantar foot, left  plantar foot, left forth dorsal toe, 9 mm brown papule on the right third toe at medial aspect. All have benign features on dermoscopy  - No other lesions of concern on areas examined.       Impression/Plan:  1. History of dysplastic nevi (moderate), no evidence of recurrence.     Recommended yearly skin exams.     2. Sun damaged skin with solar lentigines    Recommend sunscreens SPF #30 or greater, protective clothing and avoidance of tanning beds.    3. Multiple benign appearing nevi: No atypia on clinical or dermoscopic examination today.    No further intervention required. Patient to report changes.     Patient reassured of the benign nature of these lesions.    Follow-up 1 year for skin exam, sooner for lesions of concern.       Staff Involved:  Scribe/Staff    Scribe Disclosure  I, Marianna Martel, am serving as a scribe to document services personally performed by Dr. Lelo Lei MD, based on data collection and the provider's statements to me.     Provider Disclosure:   The documentation recorded by the scribe accurately reflects the services I personally performed and the decisions made by me.    Lelo Lei MD    Department of Dermatology  [facility]: Phone: [phone], Fax:[phone]  [facility]: Phone: [phone], Fax: [phone]              Again, thank you for allowing me to participate in the care of your patient.        Sincerely,        Lelo Lei MD

## 2018-12-13 NOTE — NURSING NOTE
Ruiz Deng's goals for this visit include:   Chief Complaint   Patient presents with     Derm Problem     Pt states being for a full skin check on today visit but nothing on specific       She requests these members of her care team be copied on today's visit information: Yes    PCP: Lida Navarrete    Referring Provider:  No referring provider defined for this encounter.    There were no vitals taken for this visit.    Do you need any medication refills at today's visit? No    Blue Arechiga CMA ([facility])

## 2018-12-13 NOTE — PROGRESS NOTES
[facility] Dermatology Note      Dermatology Problem List:  1. Junctional dysplastic nevus with moderate atypia, right medial thigh, s/p biopsy 2016    Encounter Date: Dec 13, 2018    CC:  Chief Complaint   Patient presents with     Derm Problem     Pt states being for a full skin check on today visit but nothing on specific         History of Present Illness:  Ms. Ruiz Deng is a 38 year old female who presents in self referral for a skin check. She was last seen by Dr. Leary on 2016 when a biopsy was done on her right medial thigh (DN with moderate atypia). Today she has no areas of concern. She does try to watch her moles for changes. No other concerns addressed today.      Past Medical History:   Patient Active Problem List   Diagnosis     IUD Surveillance     CARDIOVASCULAR SCREENING; LDL GOAL LESS THAN 160     Skin blushing/flushing     Anxiety     Decreased libido     Past Medical History:   Diagnosis Date     Abnormal Pap smear 2010    ASCUS negative HPV.  repeat pap in 2011 is NIL and per protocol, patient may resume routine screening.      Anxiety 2012     ASCUS favoring benign 14,     neg HPV plan cotest in 3 yrs.     CARDIOVASCULAR SCREENING; LDL GOAL LESS THAN 160 10/31/2010     Past Surgical History:   Procedure Laterality Date     C APPENDECTOMY  2006    open     RECONSTRUCT BREAST, IMPLANT PROSTHESIS, COMBINED  2011     SURGICAL HISTORY OF -   2014    mucocele removal     TONSILLECTOMY         Social History:  Patient reports that she has quit smoking. she has never used smokeless tobacco. She reports that she drinks alcohol. She reports that she does not use drugs. Patient admits to previous tanning bed usage. 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    Family History:  Family History   Problem Relation Age of Onset     C.A.D. Paternal Grandfather 55         age 58 from 2nd MI     Diabetes Maternal Grandfather       Alcoholism Father      Pancreatic Cancer Father      Lung Cancer Paternal Grandmother 85        smoker     Asthma No family hx of      Hypertension No family hx of      Cerebrovascular Disease No family hx of      Breast Cancer No family hx of      Cancer - colorectal No family hx of      Prostate Cancer No family hx of        Medications:  Current Outpatient Medications   Medication Sig Dispense Refill     FLUoxetine (PROZAC) 20 MG capsule TAKE 1 CAP BY MOUTH DAILY 90 capsule 3     levonorgestrel (MIRENA) 20 MCG/24HR IUD 1 each (20 mcg) by Intrauterine route once for 1 dose 1 each 0     LORazepam (ATIVAN) 1 MG tablet Take 0.5-1 tablets (0.5-1 mg) by mouth every 8 hours as needed for anxiety Take 30 minutes prior to departure.  Do not operate a vehicle after taking this medication (Patient not taking: Reported on 12/13/2018) 6 tablet 0       No Known Allergies    Review of Systems:  -Constitutional: Patient is otherwise feeling well, in usual state of health.   -Skin: As above in HPI. No additional skin concerns.    Physical exam:  Vitals: There were no vitals taken for this visit.  GEN: This is a well developed, well-nourished female in no acute distress, in a pleasant mood.    SKIN: Total skin excluding the undergarment areas was performed. The exam included the head/face, neck, both arms, chest, back, abdomen, both legs, digits and/or nails and buttocks.  - Burnett Type II  - Scattered brown macules on sun exposed areas.  - Multiple regular brown pigmented macules and papules are identified on the trunk.   - well healed scar on the right posterior thigh, no repigmentation  - many nevi on the feet, including:  3 brown macules on right plantar foot, left plantar foot, left forth dorsal toe, 9 mm brown papule on the right third toe at medial aspect. All have benign features on dermoscopy  - No other lesions of concern on areas examined.       Impression/Plan:  1. History of dysplastic nevi (moderate), no  evidence of recurrence.     Recommended yearly skin exams.     2. Sun damaged skin with solar lentigines    Recommend sunscreens SPF #30 or greater, protective clothing and avoidance of tanning beds.    3. Multiple benign appearing nevi: No atypia on clinical or dermoscopic examination today.    No further intervention required. Patient to report changes.     Patient reassured of the benign nature of these lesions.    Follow-up 1 year for skin exam, sooner for lesions of concern.       Staff Involved:  Scribe/Staff    Scribe Disclosure  I, Marianna Martel, am serving as a scribe to document services personally performed by Dr. Lelo Lei MD, based on data collection and the provider's statements to me.     Provider Disclosure:   The documentation recorded by the scribe accurately reflects the services I personally performed and the decisions made by me.    Lelo Lei MD    Department of Dermatology  [facility]: Phone: [phone], Fax:[phone]  [facility]: Phone: [phone], Fax: [phone]

## 2018-12-19 ENCOUNTER — OFFICE VISIT (OUTPATIENT)
Dept: FAMILY MEDICINE | Facility: CLINIC | Age: 38
End: 2018-12-19
Payer: COMMERCIAL

## 2018-12-19 VITALS
WEIGHT: 158 LBS | TEMPERATURE: 98.6 F | HEART RATE: 80 BPM | OXYGEN SATURATION: 97 % | HEIGHT: 68 IN | SYSTOLIC BLOOD PRESSURE: 116 MMHG | BODY MASS INDEX: 23.95 KG/M2 | RESPIRATION RATE: 18 BRPM | DIASTOLIC BLOOD PRESSURE: 72 MMHG

## 2018-12-19 DIAGNOSIS — Z30.431 SURVEILLANCE OF PREVIOUSLY PRESCRIBED INTRAUTERINE CONTRACEPTIVE DEVICE: ICD-10-CM

## 2018-12-19 DIAGNOSIS — Z11.3 SCREEN FOR STD (SEXUALLY TRANSMITTED DISEASE): ICD-10-CM

## 2018-12-19 DIAGNOSIS — Z80.0 FAMILY HISTORY OF PANCREATIC CANCER: ICD-10-CM

## 2018-12-19 DIAGNOSIS — Z12.4 SCREENING FOR MALIGNANT NEOPLASM OF CERVIX: ICD-10-CM

## 2018-12-19 DIAGNOSIS — Z00.00 ENCOUNTER FOR ROUTINE ADULT HEALTH EXAMINATION WITHOUT ABNORMAL FINDINGS: Primary | ICD-10-CM

## 2018-12-19 DIAGNOSIS — F41.9 ANXIETY: ICD-10-CM

## 2018-12-19 LAB
SPECIMEN SOURCE: NORMAL
WET PREP SPEC: NORMAL

## 2018-12-19 PROCEDURE — 87389 HIV-1 AG W/HIV-1&-2 AB AG IA: CPT | Performed by: FAMILY MEDICINE

## 2018-12-19 PROCEDURE — 86780 TREPONEMA PALLIDUM: CPT | Performed by: FAMILY MEDICINE

## 2018-12-19 PROCEDURE — 87624 HPV HI-RISK TYP POOLED RSLT: CPT | Performed by: FAMILY MEDICINE

## 2018-12-19 PROCEDURE — 36415 COLL VENOUS BLD VENIPUNCTURE: CPT | Performed by: FAMILY MEDICINE

## 2018-12-19 PROCEDURE — 99395 PREV VISIT EST AGE 18-39: CPT | Performed by: FAMILY MEDICINE

## 2018-12-19 PROCEDURE — 86803 HEPATITIS C AB TEST: CPT | Performed by: FAMILY MEDICINE

## 2018-12-19 PROCEDURE — 87591 N.GONORRHOEAE DNA AMP PROB: CPT | Performed by: FAMILY MEDICINE

## 2018-12-19 PROCEDURE — G0145 SCR C/V CYTO,THINLAYER,RESCR: HCPCS | Performed by: FAMILY MEDICINE

## 2018-12-19 PROCEDURE — 87210 SMEAR WET MOUNT SALINE/INK: CPT | Performed by: FAMILY MEDICINE

## 2018-12-19 PROCEDURE — 87491 CHLMYD TRACH DNA AMP PROBE: CPT | Performed by: FAMILY MEDICINE

## 2018-12-19 RX ORDER — FLUOXETINE 10 MG/1
10 CAPSULE ORAL DAILY
Qty: 30 CAPSULE | Refills: 1 | Status: SHIPPED | OUTPATIENT
Start: 2018-12-19 | End: 2020-02-11

## 2018-12-19 ASSESSMENT — MIFFLIN-ST. JEOR: SCORE: 1442.24

## 2018-12-19 NOTE — PROGRESS NOTES
SUBJECTIVE:   CC: Ruiz Deng is an 38 year old woman who presents for preventive health visit.     Healthy Habits:    Do you get at least three servings of calcium containing foods daily (dairy, green leafy vegetables, etc.)? yes    Amount of exercise or daily activities, outside of work: 4 day(s) per week    Problems taking medications regularly No    Medication side effects: No    Have you had an eye exam in the past two years? yes    Do you see a dentist twice per year? yes    Do you have sleep apnea, excessive snoring or daytime drowsiness?no    Mood:  -Patient and her  split up over the summer but she is handling it well and feels her mood is improved now that her  has moved out. They still get along well and are managing joint custody   -Interested in weaning off fluoxetine now that mood is improved, though patient feels she does still have some lethargy associated with depression but feels this is manageable     Contraception:  -Since  from her  patient has had several sexual partners and has not used condoms consistently  -Continues to be happy with IUD, patient has no menstrual period     Additional:  -Saw derm recently for skin check    Today's PHQ-2 Score:   PHQ-2 ( 1999 [employer]) 12/19/2018 1/8/2017   Q1: Little interest or pleasure in doing things 0 -   Q2: Feeling down, depressed or hopeless 0 -   PHQ-2 Score 0 -   Q1: Little interest or pleasure in doing things - Not at all   Q2: Feeling down, depressed or hopeless - Several days   PHQ-2 Score - 1       Abuse: Current or Past(Physical, Sexual or Emotional)- No  Do you feel safe in your environment? Yes    Social History     Tobacco Use     Smoking status: Former Smoker     Smokeless tobacco: Never Used   Substance Use Topics     Alcohol use: Yes     Comment: 2-3 drinks Q  weeks     If you drink alcohol do you typically have >3 drinks per day or >7 drinks per week? Occasionally                      Reviewed orders  with patient.  Reviewed health maintenance and updated orders accordingly - Yes  Patient Active Problem List   Diagnosis     IUD Surveillance     CARDIOVASCULAR SCREENING; LDL GOAL LESS THAN 160     Skin blushing/flushing     Anxiety     Decreased libido     Past Surgical History:   Procedure Laterality Date     C APPENDECTOMY  2006    open     RECONSTRUCT BREAST, IMPLANT PROSTHESIS, COMBINED  2011     SURGICAL HISTORY OF -   2014    mucocele removal     TONSILLECTOMY  2002       Social History     Tobacco Use     Smoking status: Former Smoker     Smokeless tobacco: Never Used   Substance Use Topics     Alcohol use: Yes     Comment: 2-3 drinks Q  weeks     Family History   Problem Relation Age of Onset     C.A.D. Paternal Grandfather 55         age 58 from 2nd MI     Diabetes Maternal Grandfather      Alcoholism Father      Pancreatic Cancer Father      Lung Cancer Paternal Grandmother 85        smoker     Asthma No family hx of      Hypertension No family hx of      Cerebrovascular Disease No family hx of      Breast Cancer No family hx of      Cancer - colorectal No family hx of      Prostate Cancer No family hx of            Mammogram not appropriate for this patient based on age.    Pertinent mammograms are reviewed under the imaging tab.  History of abnormal Pap smear: YES - updated in Problem List and Health Maintenance accordingly  PAP / HPV 2015   PAP NIL ASC-US(A) NIL     Reviewed and updated as needed this visit by clinical staff  Tobacco  Allergies  Meds  Med Hx  Surg Hx  Fam Hx  Soc Hx        Reviewed and updated as needed this visit by Provider            ROS:   ROS: 10 point ROS neg other than the symptoms noted above in the HPI.    This document serves as a record of the services and decisions personally performed by TAHIR CORONA. It was created on his/her behalf by Shawna Rain, a trained medical scribe. The creation of this document is based  "on the provider's statements to the medical scribe. Shawna Rain, December 19, 2018 12:20 PM  OBJECTIVE:   /72 (BP Location: Right arm, Patient Position: Sitting, Cuff Size: Adult Regular)   Pulse 80   Temp 98.6  F (37  C) (Oral)   Resp 18   Ht 1.715 m (5' 7.5\")   Wt 71.7 kg (158 lb)   SpO2 97%   BMI 24.38 kg/m    EXAM:  GENERAL: healthy, alert and no distress  EYES: Eyes grossly normal to inspection, PERRL and conjunctivae and sclerae normal  HENT: ear canals and TM's normal, nose and mouth without ulcers or lesions  NECK: no adenopathy, no asymmetry, masses, or scars and thyroid normal to palpation  RESP: lungs clear to auscultation - no rales, rhonchi or wheezes  BREAST: normal without masses, tenderness or nipple discharge and no palpable axillary masses or adenopathy  CV: regular rate and rhythm, normal S1 S2, no S3 or S4, no murmur, click or rub, no peripheral edema and peripheral pulses strong  ABDOMEN: soft, nontender, no hepatosplenomegaly, no masses and bowel sounds normal   (female): normal female external genitalia, normal urethral meatus, vaginal mucosa pink, moist, well rugated, and normal cervix/adnexa/uterus without masses or discharge. IUD string visible on exam   MS: no gross musculoskeletal defects noted, no edema  SKIN: no suspicious lesions or rashes  NEURO: Normal strength and tone, mentation intact and speech normal  PSYCH: mentation appears normal, affect normal/bright    ASSESSMENT/PLAN:   1. Encounter for routine adult health examination without abnormal findings  Patient offered influenza vaccination and declined. Risks of not vaccinating discussed.     2. Screening for malignant neoplasm of cervix  - Pap imaged thin layer screen with HPV - recommended age 30 - 65 years (select HPV order below)  - HPV High Risk Types DNA Cervical    3. Screen for STD (sexually transmitted disease)  Patient counseled to use condoms 100% of the time for std prevention. Reviewed risks of std's " "with iud  Repeat labs in 3-6 months  - Wet prep  - Neisseria gonorrhoeae PCR  - Chlamydia trachomatis PCR  - HIV Antigen Antibody Combo  - Hepatitis C antibody  - Treponema Abs w Reflex to RPR and Titer    4. Anxiety  Improved. See instructions below regarding weaning off fluoxetine   - FLUoxetine (PROZAC) 10 MG capsule; Take 1 capsule (10 mg) by mouth daily  Dispense: 30 capsule; Refill: 1    6. Family history of pancreatic cancer  Genetic counseling per patient's request   - CANCER RISK MGMT/CANCER GENETIC COUNSELING REFERRAL    7. IUD- string present in cervix. Reviewed Q 5 year length    COUNSELING:   Reviewed preventive health counseling, as reflected in patient instructions  Special attention given to:        Regular exercise       Healthy diet/nutrition       Vision screening       Hearing screening       Contraception       Family planning       Safe sex practices/STD prevention       HIV screeninx in teen years, 1x in adult years, and at intervals if high risk    BP Readings from Last 1 Encounters:   18 116/72     Estimated body mass index is 24.38 kg/m  as calculated from the following:    Height as of this encounter: 1.715 m (5' 7.5\").    Weight as of this encounter: 71.7 kg (158 lb).       reports that she has quit smoking. she has never used smokeless tobacco.    Patient Instructions   I will send in a prescription for 10 mg fluoxetine. If you feel your mood worsens go back up to the 20 mg and let me know so I can send the prescription in. If you are doing fine with the 10 mg dose, stay on it for two months. If you continue to do fine you can stop after two months.     I recommend [place] take an over-the-counter Vitamin D supplement because we do not get enough sun. Take 1000 international units daily.       Preventive Health Recommendations  Female Ages 26 - 39  Yearly exam:   See your health care provider every year in order to    Review health changes.     Discuss preventive care.  "     Review your medicines if you your doctor has prescribed any.    Until age 30: Get a Pap test every three years (more often if you have had an abnormal result).    After age 30: Talk to your doctor about whether you should have a Pap test every 3 years or have a Pap test with HPV screening every 5 years.   You do not need a Pap test if your uterus was removed (hysterectomy) and you have not had cancer.  You should be tested each year for STDs (sexually transmitted diseases), if you're at risk.   Talk to your provider about how often to have your cholesterol checked.  If you are at risk for diabetes, you should have a diabetes test (fasting glucose).  Shots: Get a flu shot each year. Get a tetanus shot every 10 years.   Nutrition:     Eat at least 5 servings of fruits and vegetables each day.    Eat whole-grain bread, whole-wheat pasta and brown rice instead of white grains and rice.    Get adequate Calcium and Vitamin D.     Lifestyle    Exercise at least 150 minutes a week (30 minutes a day, 5 days of the week). This will help you control your weight and prevent disease.    Limit alcohol to one drink per day.    No smoking.     Wear sunscreen to prevent skin cancer.    See your dentist every six months for an exam and cleaning.        Counseling Resources:  ATP IV Guidelines  Pooled Cohorts Equation Calculator  Breast Cancer Risk Calculator  FRAX Risk Assessment  ICSI Preventive Guidelines  Dietary Guidelines for Americans, 2010  USDA's MyPlate  ASA Prophylaxis  Lung CA Screening    The information in this document, created by the medical scribe for me, accurately reflects the services I personally performed and the decisions made by me. I have reviewed and approved this document for accuracy.   Lida Navarrete MD  [facility]

## 2018-12-20 LAB
C TRACH DNA SPEC QL NAA+PROBE: NEGATIVE
HCV AB SERPL QL IA: NONREACTIVE
HIV 1+2 AB+HIV1 P24 AG SERPL QL IA: NONREACTIVE
N GONORRHOEA DNA SPEC QL NAA+PROBE: NEGATIVE
SPECIMEN SOURCE: NORMAL
SPECIMEN SOURCE: NORMAL
T PALLIDUM AB SER QL: NONREACTIVE

## 2018-12-21 LAB
COPATH REPORT: NORMAL
PAP: NORMAL

## 2018-12-26 LAB
FINAL DIAGNOSIS: NORMAL
HPV HR 12 DNA CVX QL NAA+PROBE: NEGATIVE
HPV16 DNA SPEC QL NAA+PROBE: NEGATIVE
HPV18 DNA SPEC QL NAA+PROBE: NEGATIVE
SPECIMEN DESCRIPTION: NORMAL
SPECIMEN SOURCE CVX/VAG CYTO: NORMAL

## 2019-01-17 DIAGNOSIS — F41.9 ANXIETY: ICD-10-CM

## 2019-01-17 NOTE — TELEPHONE ENCOUNTER
"Requested Prescriptions   Pending Prescriptions Disp Refills     FLUoxetine (PROZAC) 20 MG capsule [Pharmacy Med Name: FLUOXETINE HCL 20 MG CAPSULE] 90 capsule 1     Sig: TAKE 1 CAP BY MOUTH DAILY    SSRIs Protocol Passed - 1/17/2019  4:05 AM       Passed - Recent (12 mo) or future (30 days) visit within the authorizing provider's specialty    Patient had office visit in the last 12 months or has a visit in the next 30 days with authorizing provider or within the authorizing provider's specialty.  See \"Patient Info\" tab in inbasket, or \"Choose Columns\" in Meds & Orders section of the refill encounter.             Passed - Medication is active on med list       Passed - Patient is age 18 or older       Passed - No active pregnancy on record       Passed - No positive pregnancy test in last 12 months        FLUoxetine (PROZAC) 10 MG capsule  Last Written Prescription Date:  12/19/18  Last Fill Quantity: 30,  # refills: 1   Last office visit: 12/19/2018 with prescribing provider:  Dr. Navarrete   Future Office Visit:        PHQ-9 score:    PHQ-9 SCORE 3/24/2015   PHQ-9 Total Score 0         HECTOR-7 SCORE 11/2/2012 11/5/2013 3/24/2015   Total Score 6 12 3         "

## 2019-04-15 ENCOUNTER — OFFICE VISIT (OUTPATIENT)
Dept: FAMILY MEDICINE | Facility: CLINIC | Age: 39
End: 2019-04-15
Payer: COMMERCIAL

## 2019-04-15 ENCOUNTER — ANCILLARY PROCEDURE (OUTPATIENT)
Dept: GENERAL RADIOLOGY | Facility: CLINIC | Age: 39
End: 2019-04-15
Attending: FAMILY MEDICINE
Payer: COMMERCIAL

## 2019-04-15 VITALS
SYSTOLIC BLOOD PRESSURE: 110 MMHG | OXYGEN SATURATION: 98 % | BODY MASS INDEX: 21.67 KG/M2 | WEIGHT: 143 LBS | TEMPERATURE: 98 F | HEIGHT: 68 IN | RESPIRATION RATE: 16 BRPM | DIASTOLIC BLOOD PRESSURE: 80 MMHG | HEART RATE: 80 BPM

## 2019-04-15 DIAGNOSIS — M70.21 OLECRANON BURSITIS OF RIGHT ELBOW: ICD-10-CM

## 2019-04-15 DIAGNOSIS — M25.421 ELBOW SWELLING, RIGHT: Primary | ICD-10-CM

## 2019-04-15 DIAGNOSIS — M25.421 ELBOW SWELLING, RIGHT: ICD-10-CM
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
## 2019-04-15 LAB
BASOPHILS # BLD AUTO: 0 10E9/L (ref 0–0.2)
BASOPHILS NFR BLD AUTO: 0.2 %
CRP SERPL-MCNC: <2.9 MG/L (ref 0–8)
DIFFERENTIAL METHOD BLD: ABNORMAL
EOSINOPHIL # BLD AUTO: 0.1 10E9/L (ref 0–0.7)
EOSINOPHIL NFR BLD AUTO: 0.7 %
ERYTHROCYTE [DISTWIDTH] IN BLOOD BY AUTOMATED COUNT: 13.7 % (ref 10–15)
HCT VFR BLD AUTO: 43.6 % (ref 35–47)
HGB BLD-MCNC: 14.1 G/DL (ref 11.7–15.7)
LYMPHOCYTES # BLD AUTO: 1.7 10E9/L (ref 0.8–5.3)
LYMPHOCYTES NFR BLD AUTO: 14 %
MCH RBC QN AUTO: 31.8 PG (ref 26.5–33)
MCHC RBC AUTO-ENTMCNC: 32.3 G/DL (ref 31.5–36.5)
MCV RBC AUTO: 98 FL (ref 78–100)
MONOCYTES # BLD AUTO: 0.7 10E9/L (ref 0–1.3)
MONOCYTES NFR BLD AUTO: 5.4 %
NEUTROPHILS # BLD AUTO: 9.6 10E9/L (ref 1.6–8.3)
NEUTROPHILS NFR BLD AUTO: 79.7 %
PLATELET # BLD AUTO: 321 10E9/L (ref 150–450)
RBC # BLD AUTO: 4.43 10E12/L (ref 3.8–5.2)
WBC # BLD AUTO: 12 10E9/L (ref 4–11)

## 2019-04-15 PROCEDURE — 85025 COMPLETE CBC W/AUTO DIFF WBC: CPT | Performed by: FAMILY MEDICINE

## 2019-04-15 PROCEDURE — 36415 COLL VENOUS BLD VENIPUNCTURE: CPT | Performed by: FAMILY MEDICINE

## 2019-04-15 PROCEDURE — 86140 C-REACTIVE PROTEIN: CPT | Performed by: FAMILY MEDICINE

## 2019-04-15 PROCEDURE — 73070 X-RAY EXAM OF ELBOW: CPT | Mod: RT

## 2019-04-15 PROCEDURE — 99214 OFFICE O/P EST MOD 30 MIN: CPT | Performed by: FAMILY MEDICINE

## 2019-04-15 RX ORDER — DICLOXACILLIN SODIUM 500 MG
500 CAPSULE ORAL 4 TIMES DAILY
Qty: 28 CAPSULE | Refills: 0 | Status: SHIPPED | OUTPATIENT
Start: 2019-04-15 | End: 2019-07-31

## 2019-04-15 ASSESSMENT — MIFFLIN-ST. JEOR: SCORE: 1374.2

## 2019-04-15 ASSESSMENT — PAIN SCALES - GENERAL: PAINLEVEL: EXTREME PAIN (8)

## 2019-04-15 NOTE — PATIENT INSTRUCTIONS
The orthopedics  will call you to set up an appointment.    Start antibiotics right away. At least get 3 doses in today.    Use ice and ibuprofen as needed.

## 2019-04-15 NOTE — LETTER
April 15, 2019      Ruiz Deng  [address]  [place] [address]        To Whom It May Concern,        Ruiz was seen in our office today for an acute medical ailment. Please excuse her from work on 4/16/19 due to this. She may return to work 4/18/19 if feeling better.           Sincerely,        Lida Navarrete MD           97.8

## 2019-04-15 NOTE — PROGRESS NOTES
SUBJECTIVE:   Ruiz Deng is a 38 year old female who presents to clinic today for the following   health issues:    Joint Pain    Onset: 2 weeks    Description:   Location: R elbow  Character: Dull ache    Intensity: moderate    Progression of Symptoms: worse    Accompanying Signs & Symptoms:  Other symptoms: warmth and swelling    History:   Previous similar pain: no       Precipitating factors:   Trauma or overuse: YES- Fall    Alleviating factors:  Improved by: Ibuprofen - did help    Therapies Tried and outcome: NA    -Patient fell down the stairs on her right elbow two weeks ago. She had pain and swelling but was still able to use her arm. Last night and today it has become very swollen and painful. She took ibuprofen this morning which helped  -No history of elbow injuries  -Denies fever, chills, myalgia, SOB, chest pain  -Last week patient had contact with someone with Influenza A, and she had a day of cough and chills       Additional history: as documented    Reviewed  and updated as needed this visit by clinical staff  Tobacco  Allergies  Meds         Reviewed and updated as needed this visit by Provider         Patient Active Problem List   Diagnosis     IUD Surveillance     CARDIOVASCULAR SCREENING; LDL GOAL LESS THAN 160     Skin blushing/flushing     Anxiety     Decreased libido     Olecranon bursitis of right elbow     Past Surgical History:   Procedure Laterality Date     C APPENDECTOMY  2006    open     RECONSTRUCT BREAST, IMPLANT PROSTHESIS, COMBINED  2011     SURGICAL HISTORY OF -   2014    mucocele removal     TONSILLECTOMY         Social History     Tobacco Use     Smoking status: Former Smoker     Smokeless tobacco: Never Used   Substance Use Topics     Alcohol use: Yes     Comment: 2-3 drinks Q  weeks     Family History   Problem Relation Age of Onset     C.A.D. Paternal Grandfather 55         age 58 from 2nd MI     Diabetes Maternal Grandfather      Alcoholism Father   "    Pancreatic Cancer Father      Lung Cancer Paternal Grandmother 85        smoker     Asthma No family hx of      Hypertension No family hx of      Cerebrovascular Disease No family hx of      Breast Cancer No family hx of      Cancer - colorectal No family hx of      Prostate Cancer No family hx of            ROS:  Constitutional, HEENT, cardiovascular, pulmonary, gi and gu systems are negative, except as otherwise noted.    This document serves as a record of the services and decisions personally performed by TAHIR CORONA. It was created on his/her behalf by Shawna Rain, a trained medical scribe. The creation of this document is based on the provider's statements to the medical scribe. Shawna Rain, April 15, 2019 9:23 AM  OBJECTIVE:     /80 (BP Location: Left arm, Patient Position: Sitting, Cuff Size: Adult Regular)   Pulse 80   Temp 98  F (36.7  C) (Oral)   Resp 16   Ht 1.715 m (5' 7.5\")   Wt 64.9 kg (143 lb)   SpO2 98%   BMI 22.07 kg/m    Body mass index is 22.07 kg/m .  GENERAL: healthy, alert and no distress  MS: Right elbow proximal ulna/olecranon tenderness. Olecranon swelling and redness.  No radial head tenderness. FROM right elbow. Redness outlined with permanent marker.   SKIN: Small punctate abrasion that appears to be healing inner right elbow.  PSYCH: mentation appears normal, affect normal/bright    Diagnostic Test Results:  Results for orders placed or performed in visit on 04/15/19 (from the past 24 hour(s))   CBC with platelets differential   Result Value Ref Range    WBC 12.0 (H) 4.0 - 11.0 10e9/L    RBC Count 4.43 3.8 - 5.2 10e12/L    Hemoglobin 14.1 11.7 - 15.7 g/dL    Hematocrit 43.6 35.0 - 47.0 %    MCV 98 78 - 100 fl    MCH 31.8 26.5 - 33.0 pg    MCHC 32.3 31.5 - 36.5 g/dL    RDW 13.7 10.0 - 15.0 %    Platelet Count 321 150 - 450 10e9/L    % Neutrophils 79.7 %    % Lymphocytes 14.0 %    % Monocytes 5.4 %    % Eosinophils 0.7 %    % Basophils 0.2 %    Absolute " Neutrophil 9.6 (H) 1.6 - 8.3 10e9/L    Absolute Lymphocytes 1.7 0.8 - 5.3 10e9/L    Absolute Monocytes 0.7 0.0 - 1.3 10e9/L    Absolute Eosinophils 0.1 0.0 - 0.7 10e9/L    Absolute Basophils 0.0 0.0 - 0.2 10e9/L    Diff Method Automated Method         XR elbow right: soft tissue swelling present. No apparent fracture, however small bony piece just distal to the distal ulna (unclear if this is an accessory bone vs chip fracture but I do not see ulnar defect that would support fracture). Xray personally reviewed and evaluated by me  ASSESSMENT/PLAN:       ICD-10-CM    1. Elbow swelling, right M25.421 CBC with platelets differential     CRP inflammation     XR Elbow Right 2 Views     dicloxacillin (DYNAPEN) 500 MG capsule     ORTHO  REFERRAL   2. Olecranon bursitis of right elbow M70.21 ORTHO  REFERRAL   3. Fall, initial encounter W19.XXXA      Start dicloxacillin for bursitis vs cellulitis and refer to ortho for further mgmt. May need I and D.  Area of erythema circled with permanent marker. Reviewed red flag symptoms that would precipitate the need for routine, urgent or emergent f/u. (patient will go directly to ER if redness expanding or spikes fever). Patient provided note for potential work absence tomorrow    Patient Instructions   The orthopedics  will call you to set up an appointment.    Start antibiotics right away. At least get 3 doses in today.    Use ice and ibuprofen as needed.         The information in this document, created by the medical scribe for me, accurately reflects the services I personally performed and the decisions made by me. I have reviewed and approved this document for accuracy.   Lida Navarrete MD  [facility]

## 2019-04-18 ENCOUNTER — MYC MEDICAL ADVICE (OUTPATIENT)
Dept: FAMILY MEDICINE | Facility: CLINIC | Age: 39
End: 2019-04-18

## 2019-07-31 ENCOUNTER — OFFICE VISIT (OUTPATIENT)
Dept: FAMILY MEDICINE | Facility: CLINIC | Age: 39
End: 2019-07-31
Payer: COMMERCIAL

## 2019-07-31 VITALS
HEIGHT: 68 IN | WEIGHT: 144 LBS | BODY MASS INDEX: 21.82 KG/M2 | OXYGEN SATURATION: 100 % | SYSTOLIC BLOOD PRESSURE: 102 MMHG | DIASTOLIC BLOOD PRESSURE: 70 MMHG | HEART RATE: 80 BPM | RESPIRATION RATE: 16 BRPM | TEMPERATURE: 98.4 F

## 2019-07-31 DIAGNOSIS — Z11.3 SCREEN FOR STD (SEXUALLY TRANSMITTED DISEASE): ICD-10-CM

## 2019-07-31 DIAGNOSIS — F41.9 ANXIETY: ICD-10-CM

## 2019-07-31 DIAGNOSIS — B37.31 YEAST INFECTION OF THE VAGINA: ICD-10-CM

## 2019-07-31 DIAGNOSIS — N89.8 VAGINAL ITCHING: Primary | ICD-10-CM

## 2019-07-31 DIAGNOSIS — B96.89 BV (BACTERIAL VAGINOSIS): ICD-10-CM

## 2019-07-31 DIAGNOSIS — N76.0 BV (BACTERIAL VAGINOSIS): ICD-10-CM

## 2019-07-31 LAB
SPECIMEN SOURCE: ABNORMAL
WET PREP SPEC: ABNORMAL

## 2019-07-31 PROCEDURE — 87491 CHLMYD TRACH DNA AMP PROBE: CPT | Performed by: FAMILY MEDICINE

## 2019-07-31 PROCEDURE — 36415 COLL VENOUS BLD VENIPUNCTURE: CPT | Performed by: FAMILY MEDICINE

## 2019-07-31 PROCEDURE — 86803 HEPATITIS C AB TEST: CPT | Performed by: FAMILY MEDICINE

## 2019-07-31 PROCEDURE — 86780 TREPONEMA PALLIDUM: CPT | Performed by: FAMILY MEDICINE

## 2019-07-31 PROCEDURE — 87210 SMEAR WET MOUNT SALINE/INK: CPT | Performed by: FAMILY MEDICINE

## 2019-07-31 PROCEDURE — 87591 N.GONORRHOEAE DNA AMP PROB: CPT | Performed by: FAMILY MEDICINE

## 2019-07-31 PROCEDURE — 99213 OFFICE O/P EST LOW 20 MIN: CPT | Performed by: FAMILY MEDICINE

## 2019-07-31 PROCEDURE — 87389 HIV-1 AG W/HIV-1&-2 AB AG IA: CPT | Performed by: FAMILY MEDICINE

## 2019-07-31 RX ORDER — TRIAMCINOLONE ACETONIDE 1 MG/G
OINTMENT TOPICAL 2 TIMES DAILY
Qty: 30 G | Refills: 1 | Status: SHIPPED | OUTPATIENT
Start: 2019-07-31 | End: 2020-02-11

## 2019-07-31 RX ORDER — METRONIDAZOLE 500 MG/1
500 TABLET ORAL 2 TIMES DAILY
Qty: 14 TABLET | Refills: 0 | Status: SHIPPED | OUTPATIENT
Start: 2019-07-31 | End: 2019-08-07

## 2019-07-31 RX ORDER — FLUCONAZOLE 150 MG/1
150 TABLET ORAL ONCE
Qty: 2 TABLET | Refills: 0 | Status: SHIPPED | OUTPATIENT
Start: 2019-07-31 | End: 2019-07-31

## 2019-07-31 ASSESSMENT — MIFFLIN-ST. JEOR: SCORE: 1373.74

## 2019-07-31 NOTE — PROGRESS NOTES
Subjective     Ruiz Deng is a 39 year old female who presents to clinic today for the following health issues:    HPI   Vaginal Symptoms    Description:  Vaginal Discharge: none   Itching (Pruritis): YES  Burning sensation:  no   Odor: no     Accompanying Signs & Symptoms:  Pain with Urination: YES- a little burning   Abdominal Pain: no   Fever: no     History:   Sexually active: YES  New Partner: YES  Possibility of Pregnancy:  No    Precipitating factors:   Recent Antibiotic Use: Yes    Alleviating factors:  Diflucan - finished treatment back in June     Therapies Tried and outcome: Rx   -Patient wants to go back on Prozac. She gets easily overwhelmed by things, increased anxiety and irritability. She is wondering if being off Prozac has increased her impulse decisions. Denies depressive symptoms or trouble sleeping.  -She says that she has 2 yeast infections within the past month and had cottage-cheese like symptoms. She says that she has been pretty promiscuous lately and was concerned about vaginal symptoms she has been experiencing. She does not always use condoms. Wondering about PrEP. Denies rectal sex.  -She has a Mirena IUD in place.   -She has seen a therapist for the past two years. She is having trouble with single parenting. Her ex  is excessively drinking and which makes communication very difficult.    Patient Active Problem List   Diagnosis     IUD Surveillance     CARDIOVASCULAR SCREENING; LDL GOAL LESS THAN 160     Skin blushing/flushing     Anxiety     Decreased libido     Olecranon bursitis of right elbow     Past Surgical History:   Procedure Laterality Date     C APPENDECTOMY  2006    open     RECONSTRUCT BREAST, IMPLANT PROSTHESIS, COMBINED  5/ 2011     SURGICAL HISTORY OF -   9/2014    mucocele removal     TONSILLECTOMY  2002       Social History     Tobacco Use     Smoking status: Former Smoker     Smokeless tobacco: Never Used   Substance Use Topics     Alcohol use: Yes      "Comment: 2-3 drinks Q  weeks     Family History   Problem Relation Age of Onset     C.A.D. Paternal Grandfather 55         age 58 from 2nd MI     Diabetes Maternal Grandfather      Alcoholism Father      Pancreatic Cancer Father      Lung Cancer Paternal Grandmother 85        smoker     Asthma No family hx of      Hypertension No family hx of      Cerebrovascular Disease No family hx of      Breast Cancer No family hx of      Cancer - colorectal No family hx of      Prostate Cancer No family hx of              Reviewed and updated as needed this visit by Provider         Review of Systems   ROS COMP: Constitutional, HEENT, cardiovascular, pulmonary, gi and gu systems are negative, except as otherwise noted.    This document serves as a record of the services and decisions personally performed by TAHIR CORONA. It was created on his/her behalf by Johanna Saleem, a trained medical scribe. The creation of this document is based on the provider's statements to the medical scribe. Johanna Saleem, 2019 12:07 PM      Objective    /70 (BP Location: Right arm, Patient Position: Sitting, Cuff Size: Adult Regular)   Pulse 80   Temp 98.4  F (36.9  C) (Oral)   Resp 16   Ht 1.715 m (5' 7.5\")   Wt 65.3 kg (144 lb)   SpO2 100%   BMI 22.22 kg/m    Body mass index is 22.22 kg/m .  Physical Exam   GENERAL: healthy, alert and no distress   (female): vulvar redness, clear to yellow minimal discharge, normal urethral meatus, vaginal mucosa, normal cervix/adnexa/uterus without masses or tenderness, cannot visualize IUD string  SKIN: no suspicious lesions or rashes          Assessment & Plan     1. Vaginal itching  Discussed mixing ointment with monistat cream. Follow-up if symptoms don't improve or worsen.   - Wet prep  - triamcinolone (KENALOG) 0.1 % external ointment; Apply topically 2 times daily  Dispense: 30 g; Refill: 1    2. Screen for STD (sexually transmitted disease)  Reviewed importance " of condoms for std protection. rec testing Q 3 mo. Discussed prep for HIV prevention and she wants to think about a bit more. I'm hopeful restarting the prozac will improve her impulsiveness which may help with more consistent condom use/less promiscuity  - Treponema Abs w Reflex to RPR and Titer  - Hepatitis C antibody  - Chlamydia trachomatis PCR  - Neisseria gonorrhoeae PCR  - Wet prep  - HIV Antigen Antibody Combo    3. Anxiety  Flared. Re-started Prozac which has worked well for her in the past.  Reviewed timing of taking, onset, benefits, monitoring and typical and severe AE of the medication.  Med check 4 weeks, virtual visit is okay  - FLUoxetine (PROZAC) 20 MG capsule; TAKE 1 CAP BY MOUTH DAILY  Dispense: 90 capsule; Refill: 3       Patient Instructions     Take 10 mg Prozac for 10-14 days. Then increase to 20 mg Prozac.    Mix monistat cream and prescription steroid cream and put it on externally where needed 2-3 times per day. Do not use internally.    At [facility], we strive to deliver an exceptional experience to you, every time we see you.  If you receive a survey in the mail, please send us back your thoughts. We really do value your feedback.    Your care team:     Family Medicine   TAI Joyce MD Emily Bunt, APRN CNP S. MD Ella Larson MD Angela Wermerskirchen, MD         Clinic hours: Monday - Wednesday 7 am-7 pm   Thursdays and Fridays 7 am-5 pm.     [facility]: Monday - Friday 11 am-9 pm,   Saturday and Sunday 9 am-5 pm.    [facility]: Monday -Thursday 8 am-8 pm; Friday 8 am-6 pm; Saturday and Sunday 9 am-5 pm.     [facility]: Monday - Thursday 8 am - 7 pm; Friday 8 am - 6 pm    Clinic: [phone]   [facility]: [phone]   [facility]: [phone]                Return in about 4 weeks (around 8/28/2019) for Medication  check.    Length of visit was 17 minutes with more than 50 percent of that time used for discussing medical concerns and education    The information in this document, created by the medical scribe for me, accurately reflects the services I personally performed and the decisions made by me. I have reviewed and approved this document for accuracy.   Lida Navarrete MD  [facility]

## 2019-07-31 NOTE — PATIENT INSTRUCTIONS
Take 10 mg Prozac for 10-14 days. Then increase to 20 mg Prozac.    Mix monistat cream and prescription steroid cream and put it on externally where needed 2-3 times per day. Do not use internally.    At [facility], we strive to deliver an exceptional experience to you, every time we see you.  If you receive a survey in the mail, please send us back your thoughts. We really do value your feedback.    Your care team:     Family Medicine   TAI Joyce MD Emily Bunt, APRN CNP   S. MD Ella Larson MD Angela Wermerskirchen, MD         Clinic hours: Monday - Wednesday 7 am-7 pm   Thursdays and Fridays 7 am-5 pm.     [facility]: Monday - Friday 11 am-9 pm,   Saturday and Sunday 9 am-5 pm.    [facility]: Monday -Thursday 8 am-8 pm; Friday 8 am-6 pm; Saturday and Sunday 9 am-5 pm.     [facility]: Monday - Thursday 8 am - 7 pm; Friday 8 am - 6 pm    Clinic: [phone]   [facility]: [phone]   [facility]: [phone]

## 2019-08-01 LAB
HCV AB SERPL QL IA: NONREACTIVE
HIV 1+2 AB+HIV1 P24 AG SERPL QL IA: NONREACTIVE
T PALLIDUM AB SER QL: NONREACTIVE

## 2020-01-02 ENCOUNTER — MYC REFILL (OUTPATIENT)
Dept: FAMILY MEDICINE | Facility: CLINIC | Age: 40
End: 2020-01-02

## 2020-01-02 DIAGNOSIS — F41.9 ANXIETY: ICD-10-CM

## 2020-01-02 DIAGNOSIS — F40.243 FEAR OF FLYING: ICD-10-CM

## 2020-01-02 NOTE — TELEPHONE ENCOUNTER
Requested Prescriptions   Pending Prescriptions Disp Refills     LORazepam (ATIVAN) 1 MG tablet 6 tablet 0     Sig: Take 0.5-1 tablets (0.5-1 mg) by mouth every 8 hours as needed for anxiety Take 30 minutes prior to departure.  Do not operate a vehicle after taking this medication       There is no refill protocol information for this order          LORazepam (ATIVAN) 1 MG tablet      Last Written Prescription Date:  3/8/18  Last Fill Quantity: 6,   # refills: 0  Last Office Visit: 7/31/19  Future Office visit:       Routing refill request to provider for review/approval because:  Drug not on the [facility] or [facility] refill protocol or controlled substance

## 2020-01-05 NOTE — TELEPHONE ENCOUNTER
Routing refill request to provider for review/approval because:  Drug not on the [facility] refill protocol   Rosalinda Reagan RN  [facility]

## 2020-01-06 RX ORDER — LORAZEPAM 1 MG/1
0.5-1 TABLET ORAL EVERY 8 HOURS PRN
Qty: 6 TABLET | Refills: 0 | Status: SHIPPED | OUTPATIENT
Start: 2020-01-06 | End: 2021-09-02

## 2020-02-11 ENCOUNTER — OFFICE VISIT (OUTPATIENT)
Dept: FAMILY MEDICINE | Facility: CLINIC | Age: 40
End: 2020-02-11
Payer: COMMERCIAL

## 2020-02-11 VITALS
HEIGHT: 68 IN | HEART RATE: 74 BPM | OXYGEN SATURATION: 100 % | TEMPERATURE: 98.3 F | SYSTOLIC BLOOD PRESSURE: 114 MMHG | DIASTOLIC BLOOD PRESSURE: 77 MMHG | BODY MASS INDEX: 21.98 KG/M2 | WEIGHT: 145 LBS | RESPIRATION RATE: 16 BRPM

## 2020-02-11 DIAGNOSIS — Z11.3 SCREEN FOR STD (SEXUALLY TRANSMITTED DISEASE): ICD-10-CM

## 2020-02-11 DIAGNOSIS — Z30.431 SURVEILLANCE OF PREVIOUSLY PRESCRIBED INTRAUTERINE CONTRACEPTIVE DEVICE: ICD-10-CM

## 2020-02-11 DIAGNOSIS — F41.9 ANXIETY: ICD-10-CM

## 2020-02-11 DIAGNOSIS — B37.31 YEAST INFECTION OF THE VAGINA: ICD-10-CM

## 2020-02-11 DIAGNOSIS — Z80.0 FAMILY HISTORY OF COLON CANCER: ICD-10-CM

## 2020-02-11 DIAGNOSIS — M54.41 RIGHT-SIDED LOW BACK PAIN WITH RIGHT-SIDED SCIATICA, UNSPECIFIED CHRONICITY: ICD-10-CM

## 2020-02-11 DIAGNOSIS — Z83.719 FAMILY HISTORY OF POLYPS IN THE COLON: ICD-10-CM

## 2020-02-11 DIAGNOSIS — Z00.00 ROUTINE GENERAL MEDICAL EXAMINATION AT A HEALTH CARE FACILITY: Primary | ICD-10-CM

## 2020-02-11 LAB
CHOLEST SERPL-MCNC: 204 MG/DL
FSH SERPL-ACNC: 11.1 IU/L
GLUCOSE SERPL-MCNC: 86 MG/DL (ref 70–99)
HDLC SERPL-MCNC: 92 MG/DL
LDLC SERPL CALC-MCNC: 86 MG/DL
NONHDLC SERPL-MCNC: 112 MG/DL
SPECIMEN SOURCE: ABNORMAL
T PALLIDUM AB SER QL: NONREACTIVE
TRIGL SERPL-MCNC: 128 MG/DL
WET PREP SPEC: ABNORMAL

## 2020-02-11 PROCEDURE — 86803 HEPATITIS C AB TEST: CPT | Performed by: FAMILY MEDICINE

## 2020-02-11 PROCEDURE — 90471 IMMUNIZATION ADMIN: CPT | Performed by: FAMILY MEDICINE

## 2020-02-11 PROCEDURE — 99213 OFFICE O/P EST LOW 20 MIN: CPT | Mod: 25 | Performed by: FAMILY MEDICINE

## 2020-02-11 PROCEDURE — 83001 ASSAY OF GONADOTROPIN (FSH): CPT | Performed by: FAMILY MEDICINE

## 2020-02-11 PROCEDURE — 80061 LIPID PANEL: CPT | Performed by: FAMILY MEDICINE

## 2020-02-11 PROCEDURE — 86780 TREPONEMA PALLIDUM: CPT | Performed by: FAMILY MEDICINE

## 2020-02-11 PROCEDURE — 87591 N.GONORRHOEAE DNA AMP PROB: CPT | Performed by: FAMILY MEDICINE

## 2020-02-11 PROCEDURE — 99395 PREV VISIT EST AGE 18-39: CPT | Mod: 25 | Performed by: FAMILY MEDICINE

## 2020-02-11 PROCEDURE — 87389 HIV-1 AG W/HIV-1&-2 AB AG IA: CPT | Performed by: FAMILY MEDICINE

## 2020-02-11 PROCEDURE — 87210 SMEAR WET MOUNT SALINE/INK: CPT | Performed by: FAMILY MEDICINE

## 2020-02-11 PROCEDURE — 87491 CHLMYD TRACH DNA AMP PROBE: CPT | Performed by: FAMILY MEDICINE

## 2020-02-11 PROCEDURE — 36415 COLL VENOUS BLD VENIPUNCTURE: CPT | Performed by: FAMILY MEDICINE

## 2020-02-11 PROCEDURE — 82947 ASSAY GLUCOSE BLOOD QUANT: CPT | Performed by: FAMILY MEDICINE

## 2020-02-11 PROCEDURE — 90715 TDAP VACCINE 7 YRS/> IM: CPT | Performed by: FAMILY MEDICINE

## 2020-02-11 RX ORDER — FLUCONAZOLE 150 MG/1
150 TABLET ORAL ONCE
Qty: 2 TABLET | Refills: 1 | Status: SHIPPED | OUTPATIENT
Start: 2020-02-11 | End: 2020-02-11

## 2020-02-11 ASSESSMENT — PAIN SCALES - GENERAL: PAINLEVEL: NO PAIN (0)

## 2020-02-11 ASSESSMENT — ANXIETY QUESTIONNAIRES
3. WORRYING TOO MUCH ABOUT DIFFERENT THINGS: MORE THAN HALF THE DAYS
2. NOT BEING ABLE TO STOP OR CONTROL WORRYING: MORE THAN HALF THE DAYS
5. BEING SO RESTLESS THAT IT IS HARD TO SIT STILL: NOT AT ALL
7. FEELING AFRAID AS IF SOMETHING AWFUL MIGHT HAPPEN: MORE THAN HALF THE DAYS
GAD7 TOTAL SCORE: 11
6. BECOMING EASILY ANNOYED OR IRRITABLE: NEARLY EVERY DAY
1. FEELING NERVOUS, ANXIOUS, OR ON EDGE: MORE THAN HALF THE DAYS
IF YOU CHECKED OFF ANY PROBLEMS ON THIS QUESTIONNAIRE, HOW DIFFICULT HAVE THESE PROBLEMS MADE IT FOR YOU TO DO YOUR WORK, TAKE CARE OF THINGS AT HOME, OR GET ALONG WITH OTHER PEOPLE: SOMEWHAT DIFFICULT

## 2020-02-11 ASSESSMENT — MIFFLIN-ST. JEOR: SCORE: 1386.22

## 2020-02-11 ASSESSMENT — PATIENT HEALTH QUESTIONNAIRE - PHQ9
SUM OF ALL RESPONSES TO PHQ QUESTIONS 1-9: 10
5. POOR APPETITE OR OVEREATING: NOT AT ALL
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
## 2020-02-11 NOTE — PATIENT INSTRUCTIONS
Restart 20 mg prozac daily. After about 4 weeks schedule a phone visit with me to check in and discuss adding Wellbutrin.     Please call [facility] at [phone] to schedule a colonoscopy. Double check with your insurance about coverage.      Schedule with obgyn for IUD removal and insertion.      Preventive Health Recommendations  Female Ages 26 - 39  Yearly exam:   See your health care provider every year in order to    Review health changes.     Discuss preventive care.      Review your medicines if you your doctor has prescribed any.    Until age 30: Get a Pap test every three years (more often if you have had an abnormal result).    After age 30: Talk to your doctor about whether you should have a Pap test every 3 years or have a Pap test with HPV screening every 5 years.   You do not need a Pap test if your uterus was removed (hysterectomy) and you have not had cancer.  You should be tested each year for STDs (sexually transmitted diseases), if you're at risk.   Talk to your provider about how often to have your cholesterol checked.  If you are at risk for diabetes, you should have a diabetes test (fasting glucose).  Shots: Get a flu shot each year. Get a tetanus shot every 10 years.   Nutrition:     Eat at least 5 servings of fruits and vegetables each day.    Eat whole-grain bread, whole-wheat pasta and brown rice instead of white grains and rice.    Get adequate Calcium and Vitamin D.     Lifestyle    Exercise at least 150 minutes a week (30 minutes a day, 5 days of the week). This will help you control your weight and prevent disease.    Limit alcohol to one drink per day.    No smoking.     Wear sunscreen to prevent skin cancer.    See your dentist every six months for an exam and cleaning.

## 2020-02-12 LAB
C TRACH DNA SPEC QL NAA+PROBE: NEGATIVE
HCV AB SERPL QL IA: NONREACTIVE
HIV 1+2 AB+HIV1 P24 AG SERPL QL IA: NONREACTIVE
N GONORRHOEA DNA SPEC QL NAA+PROBE: NEGATIVE
SPECIMEN SOURCE: NORMAL
SPECIMEN SOURCE: NORMAL

## 2020-02-12 ASSESSMENT — ANXIETY QUESTIONNAIRES: GAD7 TOTAL SCORE: 11

## 2020-03-01 ENCOUNTER — HEALTH MAINTENANCE LETTER (OUTPATIENT)
Age: 40
End: 2020-03-01

## 2020-03-27 ENCOUNTER — E-VISIT (OUTPATIENT)
Dept: FAMILY MEDICINE | Facility: CLINIC | Age: 40
End: 2020-03-27
Payer: COMMERCIAL

## 2020-03-27 DIAGNOSIS — F41.9 ANXIETY: Primary | ICD-10-CM

## 2020-03-27 PROCEDURE — 99421 OL DIG E/M SVC 5-10 MIN: CPT | Performed by: FAMILY MEDICINE

## 2020-03-27 RX ORDER — BUPROPION HYDROCHLORIDE 150 MG/1
150 TABLET ORAL EVERY MORNING
Qty: 30 TABLET | Refills: 0 | Status: SHIPPED | OUTPATIENT
Start: 2020-03-27 | End: 2020-04-30

## 2020-04-27 ENCOUNTER — TELEPHONE (OUTPATIENT)
Dept: FAMILY MEDICINE | Facility: CLINIC | Age: 40
End: 2020-04-27

## 2020-04-27 DIAGNOSIS — F41.9 ANXIETY: ICD-10-CM

## 2020-04-30 RX ORDER — BUPROPION HYDROCHLORIDE 150 MG/1
150 TABLET ORAL EVERY MORNING
Qty: 30 TABLET | Refills: 0 | Status: SHIPPED | OUTPATIENT
Start: 2020-04-30 | End: 2021-08-16

## 2020-04-30 NOTE — TELEPHONE ENCOUNTER
Routing refill request to provider for review/approval because:  Patient was to follow up to discuss the start of this new medication. Provider only gave enough to last till follow up.    Josselyn Mendenhall RN, [facility] Triage

## 2020-05-14 DIAGNOSIS — F41.9 ANXIETY: ICD-10-CM

## 2020-05-14 NOTE — TELEPHONE ENCOUNTER
"Requested Prescriptions   Pending Prescriptions Disp Refills     buPROPion (WELLBUTRIN XL) 150 MG 24 hr tablet 30 tablet 0     Sig: Take 1 tablet (150 mg) by mouth every morning Due for virtual med check prior to further refills       SSRIs Protocol Passed - 5/14/2020 11:16 AM        Passed - Recent (12 mo) or future (30 days) visit within the authorizing provider's specialty     Patient has had an office visit with the authorizing provider or a provider within the authorizing providers department within the previous 12 mos or has a future within next 30 days. See \"Patient Info\" tab in inbasket, or \"Choose Columns\" in Meds & Orders section of the refill encounter.              Passed - Medication is Bupropion     If the medication is Bupropion (Wellbutrin), and the patient is taking for smoking cessation; OK to refill.          Passed - Medication is active on med list        Passed - Patient is age 18 or older        Passed - No active pregnancy on record        Passed - No positive pregnancy test in last 12 months           buPROPion (WELLBUTRIN XL) 150 MG 24 hr tablet  Last Written Prescription Date:  4/30/2020  Last Fill Quantity: 30,  # refills: 0   Last office visit: 2/11/2020 with prescribing provider:  Dr. Navarrete   Future Office Visit:            PHQ-9 score:    PHQ 2/11/2020   PHQ-9 Total Score 10   Q9: Thoughts of better off dead/self-harm past 2 weeks Not at all             HECTOR-7 SCORE 11/5/2013 3/24/2015 2/11/2020   Total Score 12 3 -   Total Score - - 11         "

## 2020-05-19 NOTE — TELEPHONE ENCOUNTER
This writer attempted to contact pt on 05/19/20      Reason for call schedule virtual visit and left message.      If patient calls back:   Schedule virtual Visit appointment within 1 week with primary care, document that pt called and close encounter         Bonita Maya

## 2020-05-19 NOTE — TELEPHONE ENCOUNTER
Please reach out to patient to schedule her for virtual visit med check (overdue) and route back to me to fill Rx once visit is scheduled.

## 2020-05-19 NOTE — TELEPHONE ENCOUNTER
Routing refill request to provider for review/approval because:  Rama given x1 and patient did not follow up, please advise  Rosalinda Reagan RN  [facility]

## 2020-05-20 NOTE — TELEPHONE ENCOUNTER
This writer attempted to contact pt on 05/20/20      Reason for call schedule virtual visit and left message.      If patient calls back:   Schedule virtual appointment within 1 week with PCP, document that pt called and close encounter         Katy Solis MA

## 2020-05-21 RX ORDER — BUPROPION HYDROCHLORIDE 150 MG/1
150 TABLET ORAL EVERY MORNING
Qty: 30 TABLET | Refills: 0 | OUTPATIENT
Start: 2020-05-21

## 2020-05-21 NOTE — TELEPHONE ENCOUNTER
LM for pt to call clinic.  3rd attempt, with no response.   Pt was also sent [place] message, which was read.    Katy REILLY, Patient Care

## 2020-06-16 ENCOUNTER — VIRTUAL VISIT (OUTPATIENT)
Dept: DERMATOLOGY | Facility: CLINIC | Age: 40
End: 2020-06-16
Payer: COMMERCIAL

## 2020-06-16 DIAGNOSIS — D48.5 NEOPLASM OF UNCERTAIN BEHAVIOR OF SKIN: Primary | ICD-10-CM

## 2020-06-16 PROCEDURE — 99213 OFFICE O/P EST LOW 20 MIN: CPT | Mod: TEL | Performed by: DERMATOLOGY

## 2020-06-16 ASSESSMENT — PAIN SCALES - GENERAL: PAINLEVEL: NO PAIN (0)

## 2020-06-16 NOTE — PATIENT INSTRUCTIONS
[facility] Teledermatology Visit    Thank you for allowing us to participate in your care. Your findings, instructions and follow-up plan are as follows:  Spot on hair line, needs in person check    When should I call my doctor?    If you are worsening or not improving, please, contact us or seek urgent care as noted below.     Who should I call with questions (adults)?    [facility] (adult and pediatric): [phone]     [facility] (adult): [phone]    For urgent needs outside of business hours call the [facility] at [phone] and ask for the dermatology resident on call    If this is a medical emergency and you are unable to reach an ER, Call [phone]      Who should I call with questions (pediatric)?  [facility]- Pediatric Dermatology  Dr. Chapis Guerrero, Dr. Mylene Rivas, Dr. Thi Leonard, Isabel Richter, PA  Dr. Kinza Rivero, Dr. Estella Johnson & Dr. Javi Martinez  Non Urgent  Nurse Triage Line; [phone]- Laverne and Ramona [facility] Care Coordinators   [place] (/Complex ) [phone]    If you need a prescription refill, please contact your pharmacy. Refills are approved or denied by our Physicians during normal business hours, Monday through Fridays  Per office policy, refills will not be granted if you have not been seen within the past year (or sooner depending on your child's condition)    Scheduling Information:  Pediatric Appointment Scheduling and Call Center [phone]  Radiology Scheduling- [phone]  Sedation Unit Scheduling- [phone]  [place] Scheduling- General [phone]; Pediatric Dermatology [phone]  Main  Services: [phone]  Egyptian: [phone]  Thai: [phone]  Hmong/Malay/Slovenian: [phone]  Preadmission Nursing Department Fax Number: [phone] (Fax all pre-operative paperwork to this number)    For  urgent matters arising during evenings, weekends, or holidays that cannot wait for normal business hours please call [phone] and ask for the Dermatology Resident On-Call to be paged.

## 2020-06-16 NOTE — PROGRESS NOTES
[facility]  Store and Forward and Telephone [phone]      Impression and Recommendations (Patient Counseled on the Following):  1. Junctional dysplastic nevus with moderate atypia, right medial thigh, s/p biopsy 8/23/2016  -due for yearly skin exam, sometime this year    2. Skin lesion, hair line X4 weeks, not tender, could be resolving arthropod reaction or acne lesion  -recommend in person assessment    3. Has 2 other lesions, did not send in photo on the left head to point out when in clinic      Follow-up:   Follow-up with dermatology in approximately 1-4 weeks. Earlier for new or changing lesions or rash.      Staff only:    Patricia Leary MD    Department of Dermatology  [facility]: Phone: [phone], Fax:[phone]  [facility]: Phone: [phone], Fax: [phone]        _____________________________________________________________________________    Dermatology Problem List:  1. Junctional dysplastic nevus with moderate atypia, right medial thigh, s/p biopsy 8/23/2016    Encounter Date: Jun 16, 2020    CC:   Chief Complaint   Patient presents with     Derm Problem     Spot on right hair line 3-4 weeks ago; feels like a lump is under, itchy, smooth and raised. Denies pain or bleeding. No current Tx or therapies tried.       History of Present Illness:  I have reviewed the teledermatology information and the nursing intake corresponding to this issue. Ruiz Deng is a 40 year old female who presents via teledermatology for spot on right forehead, feels like a lump. Itchy smooth and raised. No pain or bleeding. Not treating.Does not feel it is resolving      ROS: Patient is generally feeling well today     Physical Examination:  General: Well-sounding  Skin: Focused examination within the teledermatology photograph(s) including foreheadwas performed.    Right hairline, red possibly thin plaque, possibly shiny, no telangiectasias, no crust. No bleeding    Labs:  NA    Past Medical History:   Patient Active Problem List   Diagnosis     IUD Surveillance     CARDIOVASCULAR SCREENING; LDL GOAL LESS THAN 160     Skin blushing/flushing     Anxiety     Decreased libido     Olecranon bursitis of right elbow     Right-sided low back pain with right-sided sciatica, unspecified chronicity     Past Medical History:   Diagnosis Date     Abnormal Pap smear 2010    ASCUS negative HPV.  repeat pap in 2011 is NIL and per protocol, patient may resume routine screening.      Anxiety 2012     ASCUS favoring benign 2010    neg HPV plan cotest in 3 yrs.     CARDIOVASCULAR SCREENING; LDL GOAL LESS THAN 160 10/31/2010     Past Surgical History:   Procedure Laterality Date     C APPENDECTOMY      open     RECONSTRUCT BREAST, IMPLANT PROSTHESIS, COMBINED  2011     SURGICAL HISTORY OF -   2014    mucocele removal     TONSILLECTOMY         Social History:  Patient reports that she has quit smoking. She has never used smokeless tobacco. She reports current alcohol use. She reports that she does not use drugs.    Family History:  Family History   Problem Relation Age of Onset     C.A.D. Paternal Grandfather 55         age 58 from 2nd MI     Diabetes Maternal Grandfather      Alcoholism Father      Pancreatic Cancer Father      Lung Cancer Paternal Grandmother 85        smoker     Colon Polyps Brother 35     Asthma No family hx of      Hypertension No family hx of      Cerebrovascular Disease No family hx of      Breast Cancer No family hx of      Cancer - colorectal No family hx of      Prostate Cancer No family hx of        Medications:  Current Outpatient Medications   Medication     buPROPion (WELLBUTRIN XL) 150 MG 24 hr tablet     FLUoxetine (PROZAC) 20 MG capsule     levonorgestrel (MIRENA) 20 MCG/24HR IUD     LORazepam (ATIVAN) 1 MG tablet     No  current facility-administered medications for this visit.           No Known Allergies      _____________________________________________________________________________    Teledermatology information:  - Location of patient in [place]: Home  - Patient presented as: return  - Location of teledermatologist:  ([facility] )  - Reason teledermatology is appropriate:  of National Emergency Regarding Coronavirus disease (COVID 19) Outbreak  - Image quality and interpretability: acceptable  - Physician has received verbal consent for a Video/Photos Visit from the patient? Yes  - In-person dermatology visit recommendation: yes - for physician visit  - Date of images: 6/10/2020  - Service start time: 10:58am  - Service end time:11:04pm  - Date of report: 6/16/2020

## 2020-06-16 NOTE — NURSING NOTE
"Teledermatology Nurse Call for RETURN patients seen within the last 3 years:    The patient was contacted by phone and we reviewed, \"Due to the coronavirus pandemic, we are calling to review your visit and offer you a teledermatology visit where you send in photos via RUNform. These photos will be seen by an MD or TAI. This will be billed to you and your insurance.\"  The patient was also told that \"a teledermatology visit is not as thorough as an in-person visit and that the quality of the photograph sent may not be of the same quality as that taken by the dermatology clinic, but the patient would like to proceed with an teledermatology because of National Emergency Regarding Coronavirus disease (COVID 19) Outbreak.\"  \"If a prescription is necessary we can send it directly to your pharmacy.  If lab work is needed we can place an order for that and you can then stop by our lab to have the test done at a later time.\"    The patient understood that they may receive a call from the clinic to review additional history, may still be instructed to come to clinic even after photo review and be billed for both visits with an MD. Visits are billed at different rates depending on your insurance coverage. Please reach out to your insurance provider with any questions.They were told that a photo assessment does not replace an in person skin exam. The patient understood that teledermatology is not for urgent issues and would require up to 3 business days for review. The patient denied skin pain, fever, mucosal symptoms (lesions, blisters, sores in the mouth, nose, eyes, or genitals)  IF PATIENT ENDORSES ANY OF THESE STOP AND PAGE  ON CALL ATTENDING. IF OTHER POSSIBLY URGENT SYMPTOMS THEN PAGE PHYSICIAN YOU ARE SCHEDULING WITH OR ON CALL IF NO ANSWER.       The patient chose to:                                                                                                                                                           "                                                          Consent to a teledermatology visit with Ath[employer] photos. The patient understood they must upload a Athigot photo for this visit to be completed. They indicated that the photo will be taken at their home address(if other address please document here). Patient told nursing these are already uploaded .  The patient was instructed to take photos of all all areas of concern and all areas of any rash from near and far away.                                                                                                                                                                                                                               The patient is verified to be in the [facility] at their home address at the time of the encounter.     The physician must be notified by nurse if the patient is not in the state at the time of the encounterDELETE THIS PHRASE FROM NOTE AFTER COMPLETING IF NEEDED                                                                                                                                                                                                            Patient concerns for this return visit:   Chief Complaint   Patient presents with     Derm Problem     Spot on right hair line 3-4 weeks ago; feels like a lump is under, itchy, smooth and raised. Denies pain or bleeding. No current Tx or therapies tried.         Photo instructions reviewed with patients as below:  -ALL patient needs to send photos unless they have a phone only visit approved by the clinic:  o To send photos to your doctor, respond to the message in MyMosa as many times as needed to upload your photos. Each message allows for 3 photos.  o For spots or lesions of concern, please take at least 2 photos of each site you are worried about (at different angles if needed, and at least one close up and one farther away so we can tell where it is on the  body. Be sure all photographs are in focus)  o For rashes, take photos of the entire body because it is important for us to see which areas are involved and which areas are not involved.  At a minimum, please include photos of the arms, legs, front of trunk, back of trunk, face, and a few close-ups of the rash.  Leave undergarments in place unless the rash involves the skin in these areas  o For acne, please take photos of the face, upper chest and neck, and upper chest and back  o For hair loss, please send views of the top of the head, sides of the head, back of the head and a picture of your face with your hair pulled back. Also, a photos of both hands with nails.    -For ADULT NEW patients video visits are needed, to receive an invitation and connect with your provider, the [employer] video visit technology must be accessible from your smartphone or personal device. Please click the link below for setup instructions: [email or web address]    Nursing tasks completed  -Pharmacy preference was updated.  -The nurse has dropped in the AVS information *(For adults the phrase is umdermhteleavs and for pediatrics it is their own) for the physician to route in the AVS.                                                                                                                                                                                                                         -The patient was told to contact the clinic if they have not received correspondence within 72 hours.

## 2020-06-16 NOTE — LETTER
6/16/2020         RE: Ruiz Deng  [address]  [place] [address]        Dear Colleague,    Thank you for referring your patient, Ruiz Deng, to the [facility]. Please see a copy of my visit note below.    [facility]  Store and Forward and Telephone [phone]      Impression and Recommendations (Patient Counseled on the Following):  1. Junctional dysplastic nevus with moderate atypia, right medial thigh, s/p biopsy 8/23/2016  -due for yearly skin exam, sometime this year    2. Skin lesion, hair line X4 weeks, not tender, could be resolving arthropod reaction or acne lesion  -recommend in person assessment    3. Has 2 other lesions, did not send in photo on the left head to point out when in clinic      Follow-up:   Follow-up with dermatology in approximately 1-4 weeks. Earlier for new or changing lesions or rash.      Staff only:    Patricia Leary MD    Department of Dermatology  [facility]: Phone: [phone], Fax:[phone]  [facility]: Phone: [phone], Fax: [phone]        _____________________________________________________________________________    Dermatology Problem List:  1. Junctional dysplastic nevus with moderate atypia, right medial thigh, s/p biopsy 8/23/2016    Encounter Date: Jun 16, 2020    CC:   Chief Complaint   Patient presents with     Derm Problem     Spot on right hair line 3-4 weeks ago; feels like a lump is under, itchy, smooth and raised. Denies pain or bleeding. No current Tx or therapies tried.       History of Present Illness:  I have reviewed the teledermatology information and the nursing intake corresponding to this issue. Ruiz Deng is a 40 year old female who presents via teledermatology for spot on right forehead, feels like a lump. Itchy smooth and raised. No pain or  bleeding. Not treating.Does not feel it is resolving      ROS: Patient is generally feeling well today     Physical Examination:  General: Well-sounding  Skin: Focused examination within the teledermatology photograph(s) including foreheadwas performed.   Right hairline, red possibly thin plaque, possibly shiny, no telangiectasias, no crust. No bleeding    Labs:  NA    Past Medical History:   Patient Active Problem List   Diagnosis     IUD Surveillance     CARDIOVASCULAR SCREENING; LDL GOAL LESS THAN 160     Skin blushing/flushing     Anxiety     Decreased libido     Olecranon bursitis of right elbow     Right-sided low back pain with right-sided sciatica, unspecified chronicity     Past Medical History:   Diagnosis Date     Abnormal Pap smear 2010    ASCUS negative HPV.  repeat pap in 2011 is NIL and per protocol, patient may resume routine screening.      Anxiety 2012     ASCUS favoring benign 14,     neg HPV plan cotest in 3 yrs.     CARDIOVASCULAR SCREENING; LDL GOAL LESS THAN 160 10/31/2010     Past Surgical History:   Procedure Laterality Date     C APPENDECTOMY  2006    open     RECONSTRUCT BREAST, IMPLANT PROSTHESIS, COMBINED  2011     SURGICAL HISTORY OF -   2014    mucocele removal     TONSILLECTOMY         Social History:  Patient reports that she has quit smoking. She has never used smokeless tobacco. She reports current alcohol use. She reports that she does not use drugs.    Family History:  Family History   Problem Relation Age of Onset     C.A.D. Paternal Grandfather 55         age 58 from 2nd MI     Diabetes Maternal Grandfather      Alcoholism Father      Pancreatic Cancer Father      Lung Cancer Paternal Grandmother 85        smoker     Colon Polyps Brother 35     Asthma No family hx of      Hypertension No family hx of      Cerebrovascular Disease No family hx of      Breast Cancer No family hx of      Cancer - colorectal No family hx of      Prostate Cancer No  family hx of        Medications:  Current Outpatient Medications   Medication     buPROPion (WELLBUTRIN XL) 150 MG 24 hr tablet     FLUoxetine (PROZAC) 20 MG capsule     levonorgestrel (MIRENA) 20 MCG/24HR IUD     LORazepam (ATIVAN) 1 MG tablet     No current facility-administered medications for this visit.           No Known Allergies      _____________________________________________________________________________    Teledermatology information:  - Location of patient in [place]: Home  - Patient presented as: return  - Location of teledermatologist:  ([facility] )  - Reason teledermatology is appropriate:  of National Emergency Regarding Coronavirus disease (COVID 19) Outbreak  - Image quality and interpretability: acceptable  - Physician has received verbal consent for a Video/Photos Visit from the patient? Yes  - In-person dermatology visit recommendation: yes - for physician visit  - Date of images: 6/10/2020  - Service start time: 10:58am  - Service end time:11:04pm  - Date of report: 6/16/2020       Again, thank you for allowing me to participate in the care of your patient.        Sincerely,        Patricia Leary MD

## 2020-06-19 ENCOUNTER — TELEPHONE (OUTPATIENT)
Dept: DERMATOLOGY | Facility: CLINIC | Age: 40
End: 2020-06-19

## 2020-06-19 NOTE — TELEPHONE ENCOUNTER
Do you have any of the following symptoms:  a)      Fever (or reported chills) No  b)      Shortness of Breath No  c)      Rash No  d)      Cough in the last 14 days No    If a patient reports yes to any of these symptoms, obtain direction from the provider and call the patient back to let them know if they can come in or not.  1.    Provider needs to determine if this patient should still be seen in clinic.  2.    If decision to not see in clinic, call patient back and refer them to COVID- 19 [email or web address] or schedule COVID-19 phone visit.  3.    Turn in-person visit into telephone visit (FOR RETURN PATIENTS ONLY)    Remind patients that visitors are not allowed on site. Only one legal guardian who screens negative to the above questions will be allowed to accompany patients. If a patient indicates that they will be bringing a legal guardian with them to the appointment please make sure to screen both the patient and the legal guardian for symptoms using the tool above.          Sandy Cadena LPN     Physical Therapy  Treatment    Shaq Huffman   MRN: [id number]   Admitting Diagnosis: Aspiration pneumonia of right lower lobe due to gastric secretions    PT Received On: 11/04/19  PT Start Time: 0955     PT Stop Time: 1020    PT Total Time (min): 25 min       Billable Minutes:  Gait Training 12 min and Therapeutic Exercise 13 min    Treatment Type: Treatment  PT/PTA: PT     PTA Visit Number: 0       General Precautions: Standard, aspiration, fall  Orthopedic Precautions: N/A   Braces: N/A         Subjective:  Communicated with Nurse Mclaughlin and epic chart review prior to session.  Pt found supine in bed and agreeable to tx at this time.     Pain/Comfort  Pain Rating 1: 0/10  Pain Rating Post-Intervention 1: 0/10    Objective:   Patient found with: bed alarm, zuluaga catheter, telemetry, oxygen, peripheral IV, NG tube    Functional Mobility:  Therapeutic Activities and Exercises:  Pt performed supine>sit with Min A, scooted to EOB with Min A, (B) LE therapeutic exercises sitting EOB 1 x 15 reps: MIP, TKE, AP. Pt performed sit>stand usign RW with Mod A, ambulated ~20 ft using RW with Mod A, t/f to chair using RW with Mod A.      AM-PAC 6 CLICK MOBILITY  How much help from another person does this patient currently need?   1 = Unable, Total/Dependent Assistance  2 = A lot, Maximum/Moderate Assistance  3 = A little, Minimum/Contact Guard/Supervision  4 = None, Modified [place]/Independent    Turning over in bed (including adjusting bedclothes, sheets and blankets)?: 3  Sitting down on and standing up from a chair with arms (e.g., wheelchair, bedside commode, etc.): 3  Moving from lying on back to sitting on the side of the bed?: 3  Moving to and from a bed to a chair (including a wheelchair)?: 3  Need to walk in hospital room?: 3  Climbing 3-5 steps with a railing?: 1  Basic Mobility Total Score: 16    AM-PAC Raw Score CMS G-Code Modifier Level of Impairment Assistance   6 % Total / Unable   7 - 9 CM 80 -  100% Maximal Assist   10 - 14 CL 60 - 80% Moderate Assist   15 - 19 CK 40 - 60% Moderate Assist   20 - 22 CJ 20 - 40% Minimal Assist   23 CI 1-20% SBA / CGA   24 CH 0% Independent/ Mod I     Patient left up in chair with all lines intact, call button in reach, chair alarm on, Nurse Lissette notified and son present.    Assessment:  Shaq Huffman is a 87 y.o. male with a medical diagnosis of Aspiration pneumonia of right lower lobe due to gastric secretions and presents with impaired functional mobility. Pt will benefit from continued skilled PT in order to address impairments.     Rehab identified problem list/impairments: Rehab identified problem list/impairments: weakness, impaired endurance, gait instability, impaired functional mobilty, impaired self care skills, impaired balance, decreased coordination, decreased upper extremity function, decreased lower extremity function, pain, decreased safety awareness    Rehab potential is good.    Activity tolerance: Good    Discharge recommendations: Discharge Facility/Level of Care Needs: rehabilitation facility     Barriers to discharge:      Equipment recommendations: Equipment Needed After Discharge: walker, rolling     GOALS:   Multidisciplinary Problems     Physical Therapy Goals        Problem: Physical Therapy Goal    Goal Priority Disciplines Outcome Goal Variances Interventions   Physical Therapy Goal     PT, PT/OT Ongoing, Progressing     Description:  1. Patient will perform supine to/from sit mod A  2. Patient will perform sit to/from stand with RW mod A  3. Patient will amb 25ft RW mod A                    PLAN:    Patient to be seen 5 x/week  to address the above listed problems via gait training, therapeutic activities, therapeutic exercises  Plan of Care expires: 11/09/19  Plan of Care reviewed with: patient, son    PT G-Codes  Functional Assessment Tool Used: [facility]  Score: 16    Kalyn Jose, PT/OT  11/04/2019

## 2020-08-18 ENCOUNTER — OFFICE VISIT (OUTPATIENT)
Dept: DERMATOLOGY | Facility: CLINIC | Age: 40
End: 2020-08-18
Payer: COMMERCIAL

## 2020-08-18 DIAGNOSIS — L81.1 MELASMA: ICD-10-CM

## 2020-08-18 DIAGNOSIS — D18.01 CHERRY ANGIOMA: ICD-10-CM

## 2020-08-18 DIAGNOSIS — D22.9 MULTIPLE BENIGN NEVI: ICD-10-CM

## 2020-08-18 DIAGNOSIS — L81.4 SOLAR LENTIGO: ICD-10-CM

## 2020-08-18 DIAGNOSIS — L57.8 SUN-DAMAGED SKIN: ICD-10-CM

## 2020-08-18 DIAGNOSIS — Z86.018 HISTORY OF DYSPLASTIC NEVUS: Primary | ICD-10-CM

## 2020-08-18 PROCEDURE — 99214 OFFICE O/P EST MOD 30 MIN: CPT | Performed by: DERMATOLOGY

## 2020-08-18 ASSESSMENT — PAIN SCALES - GENERAL: PAINLEVEL: NO PAIN (0)

## 2020-08-18 NOTE — LETTER
8/18/2020         RE: Ruiz Deng  [address]  [place] [address]        Dear Colleague,    Thank you for referring your patient, Ruiz Deng, to the [facility]. Please see a copy of my visit note below.    [facility] Dermatology Note    Dermatology Problem List:  1. Junctional dysplastic nevus with moderate atypia, right medial thigh, s/p biopsy 8/23/2016  -due for yearly skin exam, sometime this year    Encounter Date: Aug 18, 2020    CC:  Chief Complaint   Patient presents with     Skin Check     Area of concern on upper forehead, no hx SC but hx of DN.       History of Present Illness:  Ms. Ruiz Deng is a 40 year old female with history of one moderately atypical nevus presents for skin check and several spots of concern.    She was last seen for a skin check on 12/13/18 when no concerning skin lesions were identified.    She was seen virtually by Dr. Leary on 6/16/20 when in person examination of a lesion on the left temporal scalp was recommended. Patient also noted several other concerns that were not photographed. Plan was to address these at in person visit.     Today, Ruiz notes that lesions have resolved. She does think these were bug bites. She cannot see anything in the area anymore. No itching or pain at the sites. Denies any other specific concerns outside a patch of darker skin on her forehead that showed up this summer. No symptoms here. Has not tried any treatments. Sun light seems to make it worse.     No other concerns addressed today.    Past Medical History:   Patient Active Problem List   Diagnosis     IUD Surveillance     CARDIOVASCULAR SCREENING; LDL GOAL LESS THAN 160     Skin blushing/flushing     Anxiety     Decreased libido     Olecranon bursitis of right elbow     Right-sided low back pain with right-sided sciatica, unspecified chronicity     Past Medical History:   Diagnosis Date     Abnormal Pap smear 9/17/2010     ASCUS negative HPV.  repeat pap in 9/2011 is NIL and per protocol, patient may resume routine screening.      Anxiety 6/22/2012     ASCUS favoring benign 12/16/14, 2010    neg HPV plan cotest in 3 yrs.     CARDIOVASCULAR SCREENING; LDL GOAL LESS THAN 160 10/31/2010     Past Surgical History:   Procedure Laterality Date     C APPENDECTOMY  2006    open     RECONSTRUCT BREAST, IMPLANT PROSTHESIS, COMBINED  5/ 2011     SURGICAL HISTORY OF -   9/2014    mucocele removal     TONSILLECTOMY  2002       Social History:  Patient reports that she has quit smoking. She has never used smokeless tobacco. She reports current alcohol use. She reports that she does not use drugs. Patient admits to previous tanning bed usage. 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  Reviewed and left in chart for clinician convenience.       Family History:  Negative for skin cancer.     Medications:  Current Outpatient Medications   Medication Sig Dispense Refill     buPROPion (WELLBUTRIN XL) 150 MG 24 hr tablet Take 1 tablet (150 mg) by mouth every morning Due for virtual med check prior to further refills 30 tablet 0     FLUoxetine (PROZAC) 20 MG capsule Take 1 capsule (20 mg) by mouth daily 90 capsule 0     LORazepam (ATIVAN) 1 MG tablet Take 0.5-1 tablets (0.5-1 mg) by mouth every 8 hours as needed for anxiety Take 30 minutes prior to departure.  Do not operate a vehicle after taking this medication 6 tablet 0     levonorgestrel (MIRENA) 20 MCG/24HR IUD 1 each (20 mcg) by Intrauterine route once for 1 dose 1 each 0       No Known Allergies    Review of Systems:  -Constitutional: Patient is otherwise feeling well, in usual state of health.   -Skin: As above in HPI. No additional skin concerns.    Physical exam:  Vitals: There were no vitals taken for this visit.  GEN: This is a well developed, well-nourished female in no acute distress, in a pleasant mood.    SKIN: Total skin excluding the undergarment areas was performed.  The exam included the head/face, neck, both arms, chest, back, abdomen, both legs, digits and/or nails. Buttocks also examined.   - Burnett Type II  - Reticulated brown patch on the central superior forehead.  - Scattered brown macules on sun exposed areas.  - Multiple regular brown pigmented macules and papules are identified on the trunk. Around 40 nevi in all. All uniform.   - Pink dome shaped papules on the trunk and extremities.   - well healed circular scar on the right posterior thigh, no repigmentation  - No other lesions of concern on areas examined.     Impression/Plan:    1. Sun damaged skin with solar lentigines  - Recommend sunscreens SPF #30 or greater, protective clothing and avoidance of tanning beds.    2. Benign findings including: cherry angioma  - No further intervention required. Patient to report changes.   - Patient reassured of the benign nature of these lesions.    3. Multiple clinically benign nevi  - No further intervention required. Patient to report changes.   - Patient reassured of the benign nature of these lesions.    4. History of DN: No evidence of recurrence.   - Recommended yearly skin checks based on this history.     5. Mild melasma: Discussed importance of diligence with photoprotection. Recommended daily sunscreen and hats when she is able.     Follow-up in 1 year for skin check, earlier for new or changing lesions.     Staff Involved:  Staff only    Lelo Lei MD    Department of Dermatology  [facility]: Phone: [phone], Fax:[phone]  [facility]: Phone: [phone], Fax: [phone]            Again, thank you for allowing me to participate in the care of your patient.        Sincerely,        Lelo Lei MD

## 2020-08-18 NOTE — NURSING NOTE
Ruiz Deng's goals for this visit include:   Chief Complaint   Patient presents with     Skin Check     Area of concern on upper forehead, no hx SC but hx of DN.     She requests these members of her care team be copied on today's visit information:     PCP: Lida Navarrete    Referring Provider:  No referring provider defined for this encounter.    There were no vitals taken for this visit.    Do you need any medication refills at today's visit? No    Sintia Jin LPN

## 2020-08-18 NOTE — PROGRESS NOTES
[facility] Dermatology Note    Dermatology Problem List:  1. Junctional dysplastic nevus with moderate atypia, right medial thigh, s/p biopsy 8/23/2016  -due for yearly skin exam, sometime this year    Encounter Date: Aug 18, 2020    CC:  Chief Complaint   Patient presents with     Skin Check     Area of concern on upper forehead, no hx SC but hx of DN.       History of Present Illness:  Ms. Ruiz Deng is a 40 year old female with history of one moderately atypical nevus presents for skin check and several spots of concern.    She was last seen for a skin check on 12/13/18 when no concerning skin lesions were identified.    She was seen virtually by Dr. Leary on 6/16/20 when in person examination of a lesion on the left temporal scalp was recommended. Patient also noted several other concerns that were not photographed. Plan was to address these at in person visit.     Today, Ruiz notes that lesions have resolved. She does think these were bug bites. She cannot see anything in the area anymore. No itching or pain at the sites. Denies any other specific concerns outside a patch of darker skin on her forehead that showed up this summer. No symptoms here. Has not tried any treatments. Sun light seems to make it worse.     No other concerns addressed today.    Past Medical History:   Patient Active Problem List   Diagnosis     IUD Surveillance     CARDIOVASCULAR SCREENING; LDL GOAL LESS THAN 160     Skin blushing/flushing     Anxiety     Decreased libido     Olecranon bursitis of right elbow     Right-sided low back pain with right-sided sciatica, unspecified chronicity     Past Medical History:   Diagnosis Date     Abnormal Pap smear 9/17/2010    ASCUS negative HPV.  repeat pap in 9/2011 is NIL and per protocol, patient may resume routine screening.      Anxiety 6/22/2012     ASCUS favoring benign 12/16/14, 2010    neg HPV plan cotest in 3 yrs.     CARDIOVASCULAR SCREENING; LDL GOAL LESS THAN  160 10/31/2010     Past Surgical History:   Procedure Laterality Date     C APPENDECTOMY  2006    open     RECONSTRUCT BREAST, IMPLANT PROSTHESIS, COMBINED  5/ 2011     SURGICAL HISTORY OF -   9/2014    mucocele removal     TONSILLECTOMY  2002       Social History:  Patient reports that she has quit smoking. She has never used smokeless tobacco. She reports current alcohol use. She reports that she does not use drugs. Patient admits to previous tanning bed usage. 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  Reviewed and left in chart for clinician convenience.       Family History:  Negative for skin cancer.     Medications:  Current Outpatient Medications   Medication Sig Dispense Refill     buPROPion (WELLBUTRIN XL) 150 MG 24 hr tablet Take 1 tablet (150 mg) by mouth every morning Due for virtual med check prior to further refills 30 tablet 0     FLUoxetine (PROZAC) 20 MG capsule Take 1 capsule (20 mg) by mouth daily 90 capsule 0     LORazepam (ATIVAN) 1 MG tablet Take 0.5-1 tablets (0.5-1 mg) by mouth every 8 hours as needed for anxiety Take 30 minutes prior to departure.  Do not operate a vehicle after taking this medication 6 tablet 0     levonorgestrel (MIRENA) 20 MCG/24HR IUD 1 each (20 mcg) by Intrauterine route once for 1 dose 1 each 0       No Known Allergies    Review of Systems:  -Constitutional: Patient is otherwise feeling well, in usual state of health.   -Skin: As above in HPI. No additional skin concerns.    Physical exam:  Vitals: There were no vitals taken for this visit.  GEN: This is a well developed, well-nourished female in no acute distress, in a pleasant mood.    SKIN: Total skin excluding the undergarment areas was performed. The exam included the head/face, neck, both arms, chest, back, abdomen, both legs, digits and/or nails. Buttocks also examined.   - Burnett Type II  - Reticulated brown patch on the central superior forehead.  - Scattered brown macules on sun  exposed areas.  - Multiple regular brown pigmented macules and papules are identified on the trunk. Around 40 nevi in all. All uniform.   - Pink dome shaped papules on the trunk and extremities.   - well healed circular scar on the right posterior thigh, no repigmentation  - No other lesions of concern on areas examined.     Impression/Plan:    1. Sun damaged skin with solar lentigines  - Recommend sunscreens SPF #30 or greater, protective clothing and avoidance of tanning beds.    2. Benign findings including: cherry angioma  - No further intervention required. Patient to report changes.   - Patient reassured of the benign nature of these lesions.    3. Multiple clinically benign nevi  - No further intervention required. Patient to report changes.   - Patient reassured of the benign nature of these lesions.    4. History of DN: No evidence of recurrence.   - Recommended yearly skin checks based on this history.     5. Mild melasma: Discussed importance of diligence with photoprotection. Recommended daily sunscreen and hats when she is able.     Follow-up in 1 year for skin check, earlier for new or changing lesions.     Staff Involved:  Staff only    Lelo Lei MD    Department of Dermatology  [facility]: Phone: [phone], Fax:[phone]  [facility]: Phone: [phone], Fax: [phone]

## 2020-08-19 ENCOUNTER — OFFICE VISIT (OUTPATIENT)
Dept: OBGYN | Facility: CLINIC | Age: 40
End: 2020-08-19
Payer: COMMERCIAL

## 2020-08-19 VITALS
DIASTOLIC BLOOD PRESSURE: 70 MMHG | HEIGHT: 68 IN | SYSTOLIC BLOOD PRESSURE: 104 MMHG | WEIGHT: 147 LBS | OXYGEN SATURATION: 100 % | HEART RATE: 66 BPM | BODY MASS INDEX: 22.28 KG/M2

## 2020-08-19 DIAGNOSIS — Z30.432 ENCOUNTER FOR IUD REMOVAL: ICD-10-CM

## 2020-08-19 DIAGNOSIS — Z13.1 SCREENING FOR DIABETES MELLITUS: ICD-10-CM

## 2020-08-19 DIAGNOSIS — B37.31 YEAST INFECTION OF THE VAGINA: ICD-10-CM

## 2020-08-19 DIAGNOSIS — N63.0 LUMP OR MASS IN BREAST: ICD-10-CM

## 2020-08-19 DIAGNOSIS — Z30.430 ENCOUNTER FOR INSERTION OF INTRAUTERINE CONTRACEPTIVE DEVICE (IUD): Primary | ICD-10-CM

## 2020-08-19 DIAGNOSIS — N89.8 VAGINAL ITCHING: ICD-10-CM

## 2020-08-19 LAB
HCG UR QL: NEGATIVE
SPECIMEN SOURCE: ABNORMAL
WET PREP SPEC: ABNORMAL

## 2020-08-19 PROCEDURE — 58300 INSERT INTRAUTERINE DEVICE: CPT | Performed by: OBSTETRICS & GYNECOLOGY

## 2020-08-19 PROCEDURE — 81025 URINE PREGNANCY TEST: CPT | Performed by: OBSTETRICS & GYNECOLOGY

## 2020-08-19 PROCEDURE — 87210 SMEAR WET MOUNT SALINE/INK: CPT | Performed by: OBSTETRICS & GYNECOLOGY

## 2020-08-19 PROCEDURE — 99214 OFFICE O/P EST MOD 30 MIN: CPT | Mod: 25 | Performed by: OBSTETRICS & GYNECOLOGY

## 2020-08-19 PROCEDURE — 58301 REMOVE INTRAUTERINE DEVICE: CPT | Performed by: OBSTETRICS & GYNECOLOGY

## 2020-08-19 RX ORDER — FLUCONAZOLE 150 MG/1
150 TABLET ORAL DAILY
Qty: 3 TABLET | Refills: 0 | Status: SHIPPED | OUTPATIENT
Start: 2020-08-19 | End: 2020-08-22

## 2020-08-19 ASSESSMENT — MIFFLIN-ST. JEOR: SCORE: 1390.29

## 2020-08-19 NOTE — PROGRESS NOTES
"This 41 y/o female, , LMP 2010, presents for removal of her current Mirena IUD which  in 2020 and insertion of a new Mirena IUD for contraception.  She has used this form of birth control for years and prefers this over other options including sterilization since she enjoys the amenorrheic state.  She also c/o vaginal itching so would like a wet prep checked since she suspects a yeast infection.  She declines STD screening.  Since she feels that she has had numerous yeast infections, a diabetic screen was advised and will check a fasting glucose in the future.  She also would like a breast exam today since her boyfriend found a \"lump\" in her left breast yesterday.  She has silicone implants bilaterally - were placed about 10 years ago.  She denies any family hx of breast cancer and does not check her breasts herself.  /70 (BP Location: Right arm, Patient Position: Chair, Cuff Size: Adult Regular)   Pulse 66   Ht 1.727 m (5' 8\")   Wt 66.7 kg (147 lb)   SpO2 100%   Breastfeeding No   BMI 22.35 kg/m    ROS:  10 systems were reviewed and the positives were listed under problems.  Her UPT today was negative and she denied any risk of recent pregnancy exposure.  A breast exam was performed and a small \"band\" was noted just superior to the nipple of her right breast and a similar band was palpated in the outer aspect of her left breast.  Bilateral silicone breast implants were noted.    Next, a bi-valve speculum was placed and a wet prep was obtained and submitted.  A small amount of white vaginal discharge was noted but no lesions or growths involved the sidewalls.  No IUD strings were visualized.  Therefore, her cervix was cleansed with betadine x 3 swabs and the IUD retriever hook was used to grasp the IUD strings in the endocervical canal.  Her current  IUD was then removed without difficulty and was noted to be intact with 2 strings.  This was disposed of in a plastic specimen " cup per protocol.  Next, the 12 o'clock position of her cervix was grasped with a single-toothed tenaculum and the new IUD was inserted without difficulty after sounding her uterus to 7.5 cm.  The 2 IUD strings were trimmed to 3 cm each and there were no complications.  The tenaculum and the other instruments were removed and counts were correct.  EBL was 5 ccs and she tolerated the procedure well.  F/u care and instructions were provided both verbally and per brochures.  She was also given the wallet card to remind her of the need for removal/replacement in 5 years - 2025.  Assessment:  IUD removal of  Mirena, insertion of new Mirena IUD for contraception, bilateral breast bands, and vaginal itching  Plan:  Schedule her for a bilateral breast US and diagnostic mammogram.  Submit the wet prep and treat if +.  She will be due for a new IUD in 5 years - 2025 with instructions provided.  This was a 30-minute visit and over 50% of the time was spent in direct patient consultation and an additional 10 minutes were spent in the procedures.  New Mirena IUD NDC #92627-631-83  Exp Oct 2022  Lot #[id number]

## 2020-08-19 NOTE — PATIENT INSTRUCTIONS
If you have any questions regarding your visit, Please contact your care team.    [facility]: [phone]      [facility] HOURS TELEPHONE NUMBER   Apurva Polanco DO.    Lida -  Vivienne -     YAW Schmidt, RN  Annika, RN     Monday, Wednesday, Thursday and Friday[facility]da [facility]  8:30a.m-5:00 p.m   [facility]  [address] [place] [address]  [phone] ask for [facility]    Imaging [phone]       Urgent Care locations:    [facility] Saturday and Sunday   9 am - 5 pm    Monday-Friday   12 pm - 8 pm  Saturday and Sunday   9 am - 5 pm   [phone]     [facility] Labor and Delivery:  [phone]    If you need a medication refill, please contact your pharmacy. Please allow 3 business days for your refill to be completed.  As always, Thank you for trusting us with your healthcare needs!

## 2020-08-27 ENCOUNTER — ANCILLARY PROCEDURE (OUTPATIENT)
Dept: ULTRASOUND IMAGING | Facility: CLINIC | Age: 40
End: 2020-08-27
Attending: OBSTETRICS & GYNECOLOGY
Payer: COMMERCIAL

## 2020-08-27 ENCOUNTER — ANCILLARY PROCEDURE (OUTPATIENT)
Dept: MAMMOGRAPHY | Facility: CLINIC | Age: 40
End: 2020-08-27
Attending: OBSTETRICS & GYNECOLOGY
Payer: COMMERCIAL

## 2020-08-27 DIAGNOSIS — N63.0 LUMP OR MASS IN BREAST: ICD-10-CM

## 2020-08-27 PROCEDURE — 76642 ULTRASOUND BREAST LIMITED: CPT | Mod: 50 | Performed by: STUDENT IN AN ORGANIZED HEALTH CARE EDUCATION/TRAINING PROGRAM

## 2020-08-27 PROCEDURE — G0279 TOMOSYNTHESIS, MAMMO: HCPCS | Performed by: STUDENT IN AN ORGANIZED HEALTH CARE EDUCATION/TRAINING PROGRAM

## 2020-08-27 PROCEDURE — 77066 DX MAMMO INCL CAD BI: CPT | Performed by: STUDENT IN AN ORGANIZED HEALTH CARE EDUCATION/TRAINING PROGRAM

## 2020-09-06 ENCOUNTER — MYC MEDICAL ADVICE (OUTPATIENT)
Dept: FAMILY MEDICINE | Facility: CLINIC | Age: 40
End: 2020-09-06

## 2020-12-14 ENCOUNTER — HEALTH MAINTENANCE LETTER (OUTPATIENT)
Age: 40
End: 2020-12-14

## 2021-04-17 ENCOUNTER — HEALTH MAINTENANCE LETTER (OUTPATIENT)
Age: 41
End: 2021-04-17

## 2021-08-16 ENCOUNTER — OFFICE VISIT (OUTPATIENT)
Dept: FAMILY MEDICINE | Facility: CLINIC | Age: 41
End: 2021-08-16
Payer: COMMERCIAL

## 2021-08-16 VITALS
SYSTOLIC BLOOD PRESSURE: 98 MMHG | DIASTOLIC BLOOD PRESSURE: 62 MMHG | OXYGEN SATURATION: 98 % | BODY MASS INDEX: 24.01 KG/M2 | HEART RATE: 71 BPM | HEIGHT: 67 IN | WEIGHT: 153 LBS | RESPIRATION RATE: 18 BRPM | TEMPERATURE: 99.2 F

## 2021-08-16 DIAGNOSIS — Z12.4 SCREENING FOR CERVICAL CANCER: ICD-10-CM

## 2021-08-16 DIAGNOSIS — Z00.00 ROUTINE GENERAL MEDICAL EXAMINATION AT A HEALTH CARE FACILITY: Primary | ICD-10-CM

## 2021-08-16 DIAGNOSIS — Z11.3 ROUTINE SCREENING FOR STI (SEXUALLY TRANSMITTED INFECTION): ICD-10-CM

## 2021-08-16 DIAGNOSIS — F41.9 ANXIETY: ICD-10-CM

## 2021-08-16 PROCEDURE — 99214 OFFICE O/P EST MOD 30 MIN: CPT | Mod: 25 | Performed by: FAMILY MEDICINE

## 2021-08-16 PROCEDURE — 87624 HPV HI-RISK TYP POOLED RSLT: CPT | Performed by: FAMILY MEDICINE

## 2021-08-16 PROCEDURE — 99396 PREV VISIT EST AGE 40-64: CPT | Performed by: FAMILY MEDICINE

## 2021-08-16 PROCEDURE — 87491 CHLMYD TRACH DNA AMP PROBE: CPT | Performed by: FAMILY MEDICINE

## 2021-08-16 PROCEDURE — G0145 SCR C/V CYTO,THINLAYER,RESCR: HCPCS | Performed by: FAMILY MEDICINE

## 2021-08-16 PROCEDURE — 96127 BRIEF EMOTIONAL/BEHAV ASSMT: CPT | Performed by: FAMILY MEDICINE

## 2021-08-16 PROCEDURE — 87591 N.GONORRHOEAE DNA AMP PROB: CPT | Performed by: FAMILY MEDICINE

## 2021-08-16 RX ORDER — VENLAFAXINE HYDROCHLORIDE 75 MG/1
75 CAPSULE, EXTENDED RELEASE ORAL DAILY
Qty: 30 CAPSULE | Refills: 0 | Status: SHIPPED | OUTPATIENT
Start: 2021-08-16 | End: 2021-09-02

## 2021-08-16 RX ORDER — VENLAFAXINE HYDROCHLORIDE 37.5 MG/1
37.5 CAPSULE, EXTENDED RELEASE ORAL DAILY
Qty: 30 CAPSULE | Refills: 0 | Status: SHIPPED | OUTPATIENT
Start: 2021-08-16 | End: 2021-09-02

## 2021-08-16 ASSESSMENT — PATIENT HEALTH QUESTIONNAIRE - PHQ9
SUM OF ALL RESPONSES TO PHQ QUESTIONS 1-9: 6
10. IF YOU CHECKED OFF ANY PROBLEMS, HOW DIFFICULT HAVE THESE PROBLEMS MADE IT FOR YOU TO DO YOUR WORK, TAKE CARE OF THINGS AT HOME, OR GET ALONG WITH OTHER PEOPLE: SOMEWHAT DIFFICULT
SUM OF ALL RESPONSES TO PHQ QUESTIONS 1-9: 6

## 2021-08-16 ASSESSMENT — ANXIETY QUESTIONNAIRES
5. BEING SO RESTLESS THAT IT IS HARD TO SIT STILL: SEVERAL DAYS
8. IF YOU CHECKED OFF ANY PROBLEMS, HOW DIFFICULT HAVE THESE MADE IT FOR YOU TO DO YOUR WORK, TAKE CARE OF THINGS AT HOME, OR GET ALONG WITH OTHER PEOPLE?: SOMEWHAT DIFFICULT
GAD7 TOTAL SCORE: 15
GAD7 TOTAL SCORE: 15
7. FEELING AFRAID AS IF SOMETHING AWFUL MIGHT HAPPEN: SEVERAL DAYS
2. NOT BEING ABLE TO STOP OR CONTROL WORRYING: NEARLY EVERY DAY
4. TROUBLE RELAXING: SEVERAL DAYS
7. FEELING AFRAID AS IF SOMETHING AWFUL MIGHT HAPPEN: SEVERAL DAYS
1. FEELING NERVOUS, ANXIOUS, OR ON EDGE: NEARLY EVERY DAY
3. WORRYING TOO MUCH ABOUT DIFFERENT THINGS: NEARLY EVERY DAY
6. BECOMING EASILY ANNOYED OR IRRITABLE: NEARLY EVERY DAY
GAD7 TOTAL SCORE: 15

## 2021-08-16 ASSESSMENT — PAIN SCALES - GENERAL: PAINLEVEL: NO PAIN (0)

## 2021-08-16 ASSESSMENT — ENCOUNTER SYMPTOMS
SORE THROAT: 0
ABDOMINAL PAIN: 0
SHORTNESS OF BREATH: 0
NAUSEA: 0
BREAST MASS: 0
COUGH: 0
HEMATOCHEZIA: 0
DYSURIA: 0
DIARRHEA: 0
DIZZINESS: 0
JOINT SWELLING: 0
FEVER: 0
HEMATURIA: 0
WEAKNESS: 0
CONSTIPATION: 0
FREQUENCY: 0
PALPITATIONS: 0
HEARTBURN: 0
PARESTHESIAS: 0
EYE PAIN: 0
MYALGIAS: 0
HEADACHES: 0
NERVOUS/ANXIOUS: 1
CHILLS: 0
ARTHRALGIAS: 0

## 2021-08-16 ASSESSMENT — MIFFLIN-ST. JEOR: SCORE: 1399.24

## 2021-08-16 NOTE — PATIENT INSTRUCTIONS
Start effexor 37.5 mg once daily  Increase to 75 mg after two weeks if tolerating     Please call  [facility] in [place] at [phone] to schedule mammogram.           Preventive Health Recommendations  Female Ages 40 to 49    Yearly exam:     See your health care provider every year in order to  1. Review health changes.   2. Discuss preventive care.    3. Review your medicines if your doctor prescribed any.      Get a Pap test every three years (unless you have an abnormal result and your provider advises testing more often).      If you get Pap tests with HPV test, you only need to test every 5 years, unless you have an abnormal result. You do not need a Pap test if your uterus was removed (hysterectomy) and you have not had cancer.      You should be tested each year for STDs (sexually transmitted diseases), if you're at risk.     Ask your doctor if you should have a mammogram.      Have a colonoscopy (test for colon cancer) if someone in your family has had colon cancer or polyps before age 50.       Have a cholesterol test every 5 years.       Have a diabetes test (fasting glucose) after age 45. If you are at risk for diabetes, you should have this test every 3 years.    Shots: Get a flu shot each year. Get a tetanus shot every 10 years.     Nutrition:     Eat at least 5 servings of fruits and vegetables each day.    Eat whole-grain bread, whole-wheat pasta and brown rice instead of white grains and rice.    Get adequate Calcium and Vitamin D.      Lifestyle    Exercise at least 150 minutes a week (an average of 30 minutes a day, 5 days a week). This will help you control your weight and prevent disease.    Limit alcohol to one drink per day.    No smoking.     Wear sunscreen to prevent skin cancer.    See your dentist every six months for an exam and cleaning.  Preventive Health Recommendations  Female Ages 40 to 49    Yearly exam:   See your health care provider every year in order to  Review health  changes.   Discuss preventive care.    Review your medicines if your doctor prescribed any.    Get a Pap test every three years (unless you have an abnormal result and your provider advises testing more often).    If you get Pap tests with HPV test, you only need to test every 5 years, unless you have an abnormal result. You do not need a Pap test if your uterus was removed (hysterectomy) and you have not had cancer.    You should be tested each year for STDs (sexually transmitted diseases), if you're at risk.   Ask your doctor if you should have a mammogram.    Have a colonoscopy (test for colon cancer) if someone in your family has had colon cancer or polyps before age 50.     Have a cholesterol test every 5 years.     Have a diabetes test (fasting glucose) after age 45. If you are at risk for diabetes, you should have this test every 3 years.    Shots: Get a flu shot each year. Get a tetanus shot every 10 years.     Nutrition:   Eat at least 5 servings of fruits and vegetables each day.  Eat whole-grain bread, whole-wheat pasta and brown rice instead of white grains and rice.  Get adequate Calcium and Vitamin D.      Lifestyle  Exercise at least 150 minutes a week (an average of 30 minutes a day, 5 days a week). This will help you control your weight and prevent disease.  Limit alcohol to one drink per day.  No smoking.   Wear sunscreen to prevent skin cancer.  See your dentist every six months for an exam and cleaning.

## 2021-08-16 NOTE — PROGRESS NOTES
"   SUBJECTIVE:   CC: Ruiz Deng is an 41 year old woman who presents for preventive health visit.     Patient has been advised of split billing requirements and indicates understanding: Yes  Healthy Habits:     Getting at least 3 servings of Calcium per day:  Yes    Bi-annual eye exam:  Yes    Dental care twice a year:  Yes    Sleep apnea or symptoms of sleep apnea:  None    Diet:  Regular (no restrictions)    Frequency of exercise:  2-3 days/week    Duration of exercise:  30-45 minutes    Taking medications regularly:  No    Barriers to taking medications:  Not applicable    Medication side effects:  Not applicable    PHQ-2 Total Score: 2    Additional concerns today:  No      Anxiety is \"wild\": anxious about everything.   Worried about doiing enough for her kids, lots of mom guilt, overcompensate, resent, worried about money.   Has been cutting back on etoh and that has helped her feel more in control. Was drinking up to 3 bottles of wine per week but stopped that in June. Was etoh free for 1 month and now just 1-2 drinks when out for dinner but not other times.     Looses it at work at times as getting overwhelmed by her anxiety  Did teli med with psych- offered effexor but didn't start as worried about withdrawal from this.    Increased exercise/decreased etoh as   Ongoing moya of body image issues.   No binging/purging/restricting but just feeling bad about herself  Lack focus  Mood swings.     Just increase work to 0.9 FTE. Currently work 3 days a week but want protection (FMLA) just in case she needs to miss a shift.   Some days are overwhelmed and feel like she can't handle it and interested in fmla to protect her job if she has to call in sick.      No mensses with mirena.   Safe relationships  Same partner x 2 years.    Today's PHQ-2 Score:   PHQ-2 ( 1999 Pf[employer]) 8/16/2021   Q1: Little interest or pleasure in doing things 1   Q2: Feeling down, depressed or hopeless 1   PHQ-2 Score 2   Q1: Little " interest or pleasure in doing things Several days   Q2: Feeling down, depressed or hopeless Several days   PHQ-2 Score 2       Abuse: Current or Past (Physical, Sexual or Emotional) - No  Do you feel safe in your environment? Yes    Have you ever done Advance Care Planning? (For example, a Health Directive, POLST, or a discussion with a medical provider or your loved ones about your wishes): No, advance care planning information given to patient to review.  Patient declined advance care planning discussion at this time.    Social History     Tobacco Use     Smoking status: Former Smoker     Smokeless tobacco: Never Used   Substance Use Topics     Alcohol use: Yes     Comment: 2-3 drinks Q  weeks       Alcohol Use 8/16/2021   Prescreen: >3 drinks/day or >7 drinks/week? No   Prescreen: >3 drinks/day or >7 drinks/week? -     Reviewed orders with patient.  Reviewed health maintenance and updated orders accordingly - Yes      Breast Cancer Screening:  Any new diagnosis of family breast, ovarian, or bowel cancer? No    Mammogram Screening - Offered annual screening and updated Health Maintenance for mutual plan based on risk factor consideration    Pertinent mammograms are reviewed under the imaging tab.    History of abnormal Pap smear: YES - updated in Problem List and Health Maintenance accordingly  PAP / HPV Latest Ref Rng & Units 12/19/2018 12/29/2015 12/16/2014   PAP (Historical) - NIL NIL ASC-US(A)   HPV16 NEG:Negative Negative - -   HPV18 NEG:Negative Negative - -   HRHPV NEG:Negative Negative - -     Reviewed and updated as needed this visit by clinical staff  Tobacco  Allergies  Meds   Med Hx  Surg Hx  Fam Hx  Soc Hx        Reviewed and updated as needed this visit by Provider                    Review of Systems   Constitutional: Negative for chills and fever.   HENT: Negative for congestion, ear pain, hearing loss and sore throat.    Eyes: Negative for pain and visual disturbance.   Respiratory: Negative  "for cough and shortness of breath.    Cardiovascular: Negative for chest pain, palpitations and peripheral edema.   Gastrointestinal: Negative for abdominal pain, constipation, diarrhea, heartburn, hematochezia and nausea.   Breasts:  Negative for tenderness, breast mass and discharge.   Genitourinary: Negative for dysuria, frequency, genital sores, hematuria, pelvic pain, urgency, vaginal bleeding and vaginal discharge.   Musculoskeletal: Negative for arthralgias, joint swelling and myalgias.   Skin: Negative for rash.   Neurological: Negative for dizziness, weakness, headaches and paresthesias.   Psychiatric/Behavioral: Positive for mood changes. The patient is nervous/anxious.      Diagnostic mammo last year     OBJECTIVE:   BP 98/62 (BP Location: Right arm, Patient Position: Sitting, Cuff Size: Adult Regular)   Pulse 71   Temp 99.2  F (37.3  C) (Oral)   Resp 18   Ht 1.706 m (5' 7.17\")   Wt 69.4 kg (153 lb)   SpO2 98%   BMI 23.85 kg/m    Physical Exam  GENERAL: healthy, alert and no distress  EYES: Eyes grossly normal to inspection, PERRL and conjunctivae and sclerae normal  HENT: ear canals and TM's normal, nose and mouth without ulcers or lesions  NECK: no adenopathy, no asymmetry, masses, or scars and thyroid normal to palpation  RESP: lungs clear to auscultation - no rales, rhonchi or wheezes  BREAST: normal without masses, tenderness or nipple discharge and no palpable axillary masses or adenopathy  CV: regular rate and rhythm, normal S1 S2, no S3 or S4, no murmur, click or rub, no peripheral edema and peripheral pulses strong  ABDOMEN: soft, nontender, no hepatosplenomegaly, no masses and bowel sounds normal   (female): normal female external genitalia, normal urethral meatus, vaginal mucosa pink, moist, well rugated, and normal cervix/adnexa/uterus without masses or discharge. iud string in cervical os  MS: no gross musculoskeletal defects noted, no edema  SKIN: no suspicious lesions or " "rashes  NEURO: Normal strength and tone, mentation intact and speech normal  PSYCH: mentation appears normal, affect normal/bright    Diagnostic Test Results:  Labs reviewed in Epic    ASSESSMENT/PLAN:   1. Routine general medical examination at a health care facility    2. Anxiety  Flared. Off all meds. prozac has worked very well in the past but has caused sexual side effects. Ready to trial venlafaxine. Reviewed onset of action of meds, common side effects and plan for close f/u. Encouraged call to clinic if symptoms worsening or adverse reactions.   I'm happy to fill out fmla forms for her as very appropriate for occ time out if anxiety significant flared (she's hoping she wont use but wants as back up just in case). Start counseling planned with her EAP through employer and then establish with long-term therapist planned.   - venlafaxine (EFFEXOR-XR) 37.5 MG 24 hr capsule; Take 1 capsule (37.5 mg) by mouth daily  Dispense: 30 capsule; Refill: 0  - venlafaxine (EFFEXOR-XR) 75 MG 24 hr capsule; Take 1 capsule (75 mg) by mouth daily  Dispense: 30 capsule; Refill: 0  - MENTAL HEALTH REFERRAL  - Adult; Outpatient Treatment; Individual/Couples/Family/Group Therapy/Health Psychology; [facility] [phone]; We will contact you to schedule the appointment or please call with any questions; Future    3. Routine screening for STI (sexually transmitted infection)  - NEISSERIA GONORRHOEA PCR  - CHLAMYDIA TRACHOMATIS PCR    4. Screening for cervical cancer  Requests pap today (3 year). Hx of ascus/hpv  - Pap thin layer screen with HPV - recommended age 30 - 65 years      COUNSELING:  Reviewed preventive health counseling, as reflected in patient instructions       Regular exercise       Contraception       Safe sex practices/STD prevention       Advance Care Planning    Estimated body mass index is 23.85 kg/m  as calculated from the following:    Height as of this encounter: 1.706 m (5' 7.17\").    Weight " as of this encounter: 69.4 kg (153 lb).        She reports that she has quit smoking. She has never used smokeless tobacco.      Counseling Resources:  ATP IV Guidelines  Pooled Cohorts Equation Calculator  Breast Cancer Risk Calculator  BRCA-Related Cancer Risk Assessment: FHS-7 Tool  FRAX Risk Assessment  ICSI Preventive Guidelines  Dietary Guidelines for Americans, 2010  [employer]'s MyPlate  ASA Prophylaxis  Lung CA Screening    Lida Navarrete MD  [facility]  Answers for HPI/ROS submitted by the patient on 8/16/2021  If you checked off any problems, how difficult have these problems made it for you to do your work, take care of things at home, or get along with other people?: Somewhat difficult  PHQ9 TOTAL SCORE: 6  HECTOR 7 TOTAL SCORE: 15

## 2021-08-17 LAB
C TRACH DNA SPEC QL NAA+PROBE: NEGATIVE
N GONORRHOEA DNA SPEC QL NAA+PROBE: NEGATIVE

## 2021-08-17 ASSESSMENT — ANXIETY QUESTIONNAIRES: GAD7 TOTAL SCORE: 15

## 2021-08-17 ASSESSMENT — PATIENT HEALTH QUESTIONNAIRE - PHQ9: SUM OF ALL RESPONSES TO PHQ QUESTIONS 1-9: 6

## 2021-08-17 NOTE — RESULT ENCOUNTER NOTE
Ruiz,  It was a pleasure to see you in the office recently.   The chlamydia and gonorrhea screening tests were negative.   Please MyChart or call if you have any concerns or questions.   Sincerely,  Lida Navarrete MD

## 2021-08-19 LAB
BKR LAB AP GYN ADEQUACY: NORMAL
BKR LAB AP GYN INTERPRETATION: NORMAL
BKR LAB AP HPV REFLEX: NORMAL
BKR LAB AP PREVIOUS ABNORMAL: NORMAL
PATH REPORT.COMMENTS IMP SPEC: NORMAL
PATH REPORT.RELEVANT HX SPEC: NORMAL

## 2021-08-20 LAB
HUMAN PAPILLOMA VIRUS 16 DNA: NEGATIVE
HUMAN PAPILLOMA VIRUS 18 DNA: NEGATIVE
HUMAN PAPILLOMA VIRUS FINAL DIAGNOSIS: NORMAL
HUMAN PAPILLOMA VIRUS OTHER HR: NEGATIVE

## 2021-08-25 ENCOUNTER — TELEPHONE (OUTPATIENT)
Dept: DERMATOLOGY | Facility: CLINIC | Age: 41
End: 2021-08-25

## 2021-08-25 NOTE — TELEPHONE ENCOUNTER
[facility] Call Center    Phone Message    May a detailed message be left on voicemail: yes     Reason for Call: Other: Pt is scheduled for visit on 21 but would like her son to be seen instead for a skin reaction. Pt's son's name is Jorge Deng,  08, child will be 13 in 3 months.     Pt would like a call back to discuss.     Thank you.     Action Taken: Message routed to:  Adult Clinics: Dermatology p [id number]    Travel Screening: Not Applicable

## 2021-08-26 NOTE — TELEPHONE ENCOUNTER
" advised pt see peds derm. RN called mom and notified of above message. Mom verbalized understanding. RN asked mom if she would like assistance with scheduling a Peds Derm appt and Mom agreed with this and states \"that would be great thanks\". Routing to  procedure scheduling pool to please assist with scheduling a Peds Derm appt. Thank you....Ignacia Cheema RN    "

## 2021-08-26 NOTE — TELEPHONE ENCOUNTER
Contacted patient and scheduled her son with pediatric dermatology.    Ignacia Page  Surgical Specialties Procedure   [facility]  8/26/2021 9:56 AM

## 2021-08-31 ENCOUNTER — TELEPHONE (OUTPATIENT)
Dept: FAMILY MEDICINE | Facility: CLINIC | Age: 41
End: 2021-08-31

## 2021-08-31 NOTE — TELEPHONE ENCOUNTER
[email or web address].  Placed on Dr. Navarrete's desk for signature.

## 2021-09-01 NOTE — PROGRESS NOTES
[facility] Dermatology Note  Encounter Date: Sep 2, 2021  Office Visit     Dermatology Problem List:  Last skin check 9/2/21, recommended yearly.  1. Junctional dysplastic nevus with moderate atypia, right medial thigh, s/p bx 8/23/2016  2. Hypotrichosis:  - Current tx: latisse  3. 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  Family History: Negative for skin cancer.  ____________________________________________    ASSESSMENT/PLAN:    # History of DN: No evidence of recurrence. While these are benign, they are a marker for increased melanoma risk.  - Recommended yearly skin checks.    # Sun damaged skin with solar lentigines. Chronic. Stable.   - Recommended sunscreens SPF #30 or greater, protective clothing and avoidance of tanning beds.     # Benign findings include: seborrheic keratosis, cherry angioma. Self limited, minor.   - No further intervention required. Patient to report changes.  - Patient reassured of the benign nature of these lesions.    # Multiple clinically benign nevi. Chronic. Stable.   - No further intervention required. Patient to report changes.  - Patient reassured of the benign nature of these lesions.    # Mild melasma. Chronic, stable  - Discussed possible triggers of hormones, sun and heat. Discouraged lasers and microneedling.   - Prescribed hydroquinone 4% cream to be used BID for 3 months on, 1 month off. Discussed risk of paradoxical hyperpigmentation with continuous use. Discussed that this is not covered by insurance. Discussed Goodrx.    # Hypotrichosis: chronic, stable.   - Prescribed latisse to be used at bedtime.  - Reviewed to use minimally given risk of orbital hollowing.    Procedures Performed:   None.    Follow-up: 1 year in-person for skin check, or earlier for new or changing lesions.    Staff and Scribe:     Scribe Disclosure:   HOWIE  Lety Reyes, am serving as a scribe to document services personally performed by this physician, Dr. Lelo Lei, based on data collection and the provider's statements to me.     Provider Disclosure:   The documentation recorded by the scribe accurately reflects the services I personally performed and the decisions made by me.    Lelo Lei MD    Department of Dermatology  [facility]: Phone: [phone], Fax:[phone]  [facility]: Phone: [phone], Fax: [phone]    ____________________________________________    CC: Derm Problem (yearly skin check, hx of DN, no area's of concern, no personal or family hx of skin cancer)    HPI:  Ms. Ruiz Deng is a(n) 41 year old female who presents today as a return patient for skin check.    Last seen 8/18/2020. At that time, photoprotection was discussed for mild melasma. No concerning lesions were noted.    Today patient notes no areas of concern for skin cancer. Wonders about what to do for melasma and thin eyelashes.    Patient is otherwise feeling well, without additional concerns.    Labs:  NA    Physical exam:  Vitals: There were no vitals taken for this visit.  SKIN: Total skin excluding the undergarment areas was performed. The exam included the head/face, neck, both arms, chest, back, abdomen, buttocks, both legs, digits and/or nails.   - Burnett Type II  - Reticulated brown patch on the central superior forehead and lateral cheeks.  - No eyelid dermatitis.  - Scattered brown macules on sun exposed areas.  - Pink dome shaped papules on the trunk and extremities.   - Multiple regular brown pigmented macules and papules are identified on the body. Around 40 nevi in all. All uniform.   - There is a waxy stuck on tan to brown papule on the upper abdomen.  - Well healed circular scar on the right medial thigh, no  repigmentation.  - No other lesions of concern on areas examined.    Medications:  Current Outpatient Medications   Medication     levonorgestrel (MIRENA) 20 MCG/24HR IUD     LORazepam (ATIVAN) 1 MG tablet     venlafaxine (EFFEXOR-XR) 37.5 MG 24 hr capsule     venlafaxine (EFFEXOR-XR) 75 MG 24 hr capsule     Current Facility-Administered Medications   Medication     levonorgestrel (MIRENA) 20 MCG/24HR IUD 20 mcg      Past Medical History:   Patient Active Problem List   Diagnosis     IUD Surveillance     CARDIOVASCULAR SCREENING; LDL GOAL LESS THAN 160     Skin blushing/flushing     Anxiety     Decreased libido     Olecranon bursitis of right elbow     Right-sided low back pain with right-sided sciatica, unspecified chronicity     Past Medical History:   Diagnosis Date     Abnormal Pap smear 9/17/2010    ASCUS negative HPV.  repeat pap in 9/2011 is NIL and per protocol, patient may resume routine screening.      Anxiety 6/22/2012     ASCUS favoring benign 12/16/14, 2010    neg HPV plan cotest in 3 yrs.     CARDIOVASCULAR SCREENING; LDL GOAL LESS THAN 160 10/31/2010        CC No referring provider defined for this encounter. on close of this encounter.

## 2021-09-02 ENCOUNTER — OFFICE VISIT (OUTPATIENT)
Dept: DERMATOLOGY | Facility: CLINIC | Age: 41
End: 2021-09-02
Payer: COMMERCIAL

## 2021-09-02 DIAGNOSIS — L82.1 SEBORRHEIC KERATOSIS: ICD-10-CM

## 2021-09-02 DIAGNOSIS — D22.9 MULTIPLE BENIGN NEVI: ICD-10-CM

## 2021-09-02 DIAGNOSIS — L81.1 MELASMA: ICD-10-CM

## 2021-09-02 DIAGNOSIS — L65.9 HYPOTRICHOSIS: ICD-10-CM

## 2021-09-02 DIAGNOSIS — D18.01 CHERRY ANGIOMA: ICD-10-CM

## 2021-09-02 DIAGNOSIS — L57.8 SUN-DAMAGED SKIN: ICD-10-CM

## 2021-09-02 DIAGNOSIS — Z86.018 HISTORY OF DYSPLASTIC NEVUS: Primary | ICD-10-CM

## 2021-09-02 DIAGNOSIS — L81.4 SOLAR LENTIGO: ICD-10-CM

## 2021-09-02 PROCEDURE — 99214 OFFICE O/P EST MOD 30 MIN: CPT | Performed by: DERMATOLOGY

## 2021-09-02 RX ORDER — HYDROQUINONE 40 MG/G
CREAM TOPICAL
Qty: 28.35 G | Refills: 3 | Status: SHIPPED | OUTPATIENT
Start: 2021-09-02 | End: 2022-11-11

## 2021-09-02 RX ORDER — BIMATOPROST 3 UG/ML
1 SOLUTION TOPICAL AT BEDTIME
Qty: 5 ML | Refills: 11 | Status: SHIPPED | OUTPATIENT
Start: 2021-09-02 | End: 2022-11-11

## 2021-09-02 NOTE — PATIENT INSTRUCTIONS
I recommend viewing coupons on [email or web address].    Start hydroquinone 4% cream to be used BID for 3 months on, 1 month off.

## 2021-09-02 NOTE — LETTER
9/2/2021         RE: Ruiz Deng  [address]  [place] [address]        Dear Colleague,    Thank you for referring your patient, Ruiz Deng, to the [facility]. Please see a copy of my visit note below.    [facility] Dermatology Note  Encounter Date: Sep 2, 2021  Office Visit     Dermatology Problem List:  Last skin check 9/2/21, recommended yearly.  1. Junctional dysplastic nevus with moderate atypia, right medial thigh, s/p bx 8/23/2016  2. Hypotrichosis:  - Current tx: latisse  3. 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  Family History: Negative for skin cancer.  ____________________________________________    ASSESSMENT/PLAN:    # History of DN: No evidence of recurrence. While these are benign, they are a marker for increased melanoma risk.  - Recommended yearly skin checks.    # Sun damaged skin with solar lentigines. Chronic. Stable.   - Recommended sunscreens SPF #30 or greater, protective clothing and avoidance of tanning beds.     # Benign findings include: seborrheic keratosis, cherry angioma. Self limited, minor.   - No further intervention required. Patient to report changes.  - Patient reassured of the benign nature of these lesions.    # Multiple clinically benign nevi. Chronic. Stable.   - No further intervention required. Patient to report changes.  - Patient reassured of the benign nature of these lesions.    # Mild melasma. Chronic, stable  - Discussed possible triggers of hormones, sun and heat. Discouraged lasers and microneedling.   - Prescribed hydroquinone 4% cream to be used BID for 3 months on, 1 month off. Discussed risk of paradoxical hyperpigmentation with continuous use. Discussed that this is not covered by insurance. Discussed Goodrx.    # Hypotrichosis: chronic, stable.   - Prescribed latisse to be  used at bedtime.  - Reviewed to use minimally given risk of orbital hollowing.    Procedures Performed:   None.    Follow-up: 1 year in-person for skin check, or earlier for new or changing lesions.    Staff and Scribe:     Scribe Disclosure:   I, Lety Reyes, am serving as a scribe to document services personally performed by this physician, Dr. Lelo Lei, based on data collection and the provider's statements to me.     Provider Disclosure:   The documentation recorded by the scribe accurately reflects the services I personally performed and the decisions made by me.    Lelo Lei MD    Department of Dermatology  [facility]: Phone: [phone], Fax:[phone]  [facility]: Phone: [phone], Fax: [phone]    ____________________________________________    CC: Derm Problem (yearly skin check, hx of DN, no area's of concern, no personal or family hx of skin cancer)    HPI:  Ms. Ruiz Deng is a(n) 41 year old female who presents today as a return patient for skin check.    Last seen 8/18/2020. At that time, photoprotection was discussed for mild melasma. No concerning lesions were noted.    Today patient notes no areas of concern for skin cancer. Wonders about what to do for melasma and thin eyelashes.    Patient is otherwise feeling well, without additional concerns.    Labs:  NA    Physical exam:  Vitals: There were no vitals taken for this visit.  SKIN: Total skin excluding the undergarment areas was performed. The exam included the head/face, neck, both arms, chest, back, abdomen, buttocks, both legs, digits and/or nails.   - Burnett Type II  - Reticulated brown patch on the central superior forehead and lateral cheeks.  - No eyelid dermatitis.  - Scattered brown macules on sun exposed areas.  - Pink dome shaped papules on the trunk and extremities.    - Multiple regular brown pigmented macules and papules are identified on the body. Around 40 nevi in all. All uniform.   - There is a waxy stuck on tan to brown papule on the upper abdomen.  - Well healed circular scar on the right medial thigh, no repigmentation.  - No other lesions of concern on areas examined.    Medications:  Current Outpatient Medications   Medication     levonorgestrel (MIRENA) 20 MCG/24HR IUD     LORazepam (ATIVAN) 1 MG tablet     venlafaxine (EFFEXOR-XR) 37.5 MG 24 hr capsule     venlafaxine (EFFEXOR-XR) 75 MG 24 hr capsule     Current Facility-Administered Medications   Medication     levonorgestrel (MIRENA) 20 MCG/24HR IUD 20 mcg      Past Medical History:   Patient Active Problem List   Diagnosis     IUD Surveillance     CARDIOVASCULAR SCREENING; LDL GOAL LESS THAN 160     Skin blushing/flushing     Anxiety     Decreased libido     Olecranon bursitis of right elbow     Right-sided low back pain with right-sided sciatica, unspecified chronicity     Past Medical History:   Diagnosis Date     Abnormal Pap smear 9/17/2010    ASCUS negative HPV.  repeat pap in 9/2011 is NIL and per protocol, patient may resume routine screening.      Anxiety 6/22/2012     ASCUS favoring benign 12/16/14, 2010    neg HPV plan cotest in 3 yrs.     CARDIOVASCULAR SCREENING; LDL GOAL LESS THAN 160 10/31/2010        CC No referring provider defined for this encounter. on close of this encounter.      Again, thank you for allowing me to participate in the care of your patient.        Sincerely,        Lelo Lei MD

## 2021-09-02 NOTE — NURSING NOTE
Ruiz Deng's goals for this visit include:   Chief Complaint   Patient presents with     Derm Problem     yearly skin check, hx of DN, no area's of concern, no personal or family hx of skin cancer       She requests these members of her care team be copied on today's visit information: no    PCP: Lida Navarrete    Referring Provider:  No referring provider defined for this encounter.    There were no vitals taken for this visit.    Do you need any medication refills at today's visit? No    rIis Steven LPN

## 2021-09-03 NOTE — TELEPHONE ENCOUNTER
Form stamped faxed to [facility] [phone] and sent for immediate scanning      Bonita HARDEN (R M)

## 2021-10-02 ENCOUNTER — HEALTH MAINTENANCE LETTER (OUTPATIENT)
Age: 41
End: 2021-10-02

## 2022-05-09 ENCOUNTER — OFFICE VISIT (OUTPATIENT)
Dept: FAMILY MEDICINE | Facility: CLINIC | Age: 42
End: 2022-05-09
Payer: COMMERCIAL

## 2022-05-09 VITALS — DIASTOLIC BLOOD PRESSURE: 70 MMHG | SYSTOLIC BLOOD PRESSURE: 112 MMHG

## 2022-05-09 DIAGNOSIS — L81.4 LENTIGINES: ICD-10-CM

## 2022-05-09 DIAGNOSIS — D22.9 BENIGN NEVUS OF SKIN: ICD-10-CM

## 2022-05-09 DIAGNOSIS — L65.9 HAIR LOSS: Primary | ICD-10-CM

## 2022-05-09 PROCEDURE — 99213 OFFICE O/P EST LOW 20 MIN: CPT | Performed by: PHYSICIAN ASSISTANT

## 2022-05-09 NOTE — PROGRESS NOTES
[facility] Dermatology Note  Encounter Date: May 9, 2022  Office Visit     Dermatology Problem List:  Last skin check 9/2/21, recommended yearly.  1. Junctional dysplastic nevus with moderate atypia, right medial thigh, s/p bx 8/23/2016  2. Hypotrichosis:  - Current tx: latisse  3. Melasma  - Current tx: hydroquinone 4% cream BID x3 months on, 1 month off, repeating as needed  4. 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  Family History: Negative for skin cancer.  ____________________________________________    Assessment & Plan:    # Hair Loss, generalized thinning.   -men's rogaine foam or solution recommended  -edu can take 4-6mo for results and that if she stopped she would lose hair  -consider lab work up in future    # Solar lentigines.   -reassured  -Sunscreen: Apply 20 minutes prior to going outdoors and reapply every two hours, when wet or sweating. We recommend using an SPF 30 or higher, and to use one that is water resistant.       # Benign nevus - chin, irritated. Plan for shave removal at another appointment - patient to schedule a separate visit  - Photo 5/9/22  -edu would be a shave removal, sent to path and that it is possible she could have a scar or that the mole could grow back    Procedures Performed:   none    Follow-up: 5 month(s) in-person, or earlier for new or changing lesions    Staff and Scribe:     Scribe Disclosure:  I, Halle Graham, am serving as a scribe to prep the notes for Kaley Pringle PA-C .    Provider Disclosure:   The documentation recorded by the scribe accurately reflects the services I personally performed and the decisions made by me.    All risks, benefits and alternatives were discussed with patient.  Patient is in agreement and understands the assessment and plan.  All questions were answered.    Kaley Pringle PA-C, MPAS  [facility]: Phone: [phone], Fax: [phone]  [facility]: Phone: [phone],  Fax: [phone]  [facility]: Phone: [phone], Fax: [phone]  ____________________________________________    CC: No chief complaint on file.    HPI:  Ms. Ruiz Deng is a(n) 41 year old female who presents today as a return patient for spot check. Last seen by Dr. Lei at which point she was given hydroquinone 4% cream for treatment of melasma. She otherwise had a benign skin exam. Hx DN in the past. Notes today one brown spot above the L eyebrow as well as recommendations for thinning hair. Notes women have hair loss in her family. No sx on scalp. Additionally has bump on her chin which has been present a long time but slowly increasing in size. Would like removal if possible.     Patient is otherwise feeling well, without additional skin concerns.    Labs Reviewed:  N/A    Physical Exam:  Vitals: There were no vitals taken for this visit.  SKIN: Focused examination of face and neck was performed.  -4mm flesh colored and light brown papule on the R submental  - Scattered brown macules on sun exposed areas.  - No other lesions of concern on areas examined.           Medications:  Current Outpatient Medications   Medication     bimatoprost (LATISSE) 0.03 % external opthalmic solution     hydroquinone (BRYANNA) 4 % external cream     levonorgestrel (MIRENA) 20 MCG/24HR IUD     Current Facility-Administered Medications   Medication     levonorgestrel (MIRENA) 20 MCG/24HR IUD 20 mcg      Past Medical History:   Patient Active Problem List   Diagnosis     IUD Surveillance     CARDIOVASCULAR SCREENING; LDL GOAL LESS THAN 160     Skin blushing/flushing     Anxiety     Decreased libido     Olecranon bursitis of right elbow     Right-sided low back pain with right-sided sciatica, unspecified chronicity     Past Medical History:   Diagnosis Date     Abnormal Pap smear 9/17/2010     ASCUS negative HPV.  repeat pap in 9/2011 is NIL and per protocol, patient may resume routine screening.      Anxiety 6/22/2012     ASCUS favoring benign 12/16/14, 2010    neg HPV plan cotest in 3 yrs.     CARDIOVASCULAR SCREENING; LDL GOAL LESS THAN 160 10/31/2010

## 2022-05-09 NOTE — LETTER
5/9/2022         RE: Ruiz Deng  [address]  [place] [address]        Dear Colleague,    Thank you for referring your patient, Ruiz Deng, to the [facility]. Please see a copy of my visit note below.    [facility] Dermatology Note  Encounter Date: May 9, 2022  Office Visit     Dermatology Problem List:  Last skin check 9/2/21, recommended yearly.  1. Junctional dysplastic nevus with moderate atypia, right medial thigh, s/p bx 8/23/2016  2. Hypotrichosis:  - Current tx: latisse  3. Melasma  - Current tx: hydroquinone 4% cream BID x3 months on, 1 month off, repeating as needed  4. 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  Family History: Negative for skin cancer.  ____________________________________________    Assessment & Plan:    # Hair Loss, generalized thinning.   -men's rogaine foam or solution recommended  -edu can take 4-6mo for results and that if she stopped she would lose hair  -consider lab work up in future    # Solar lentigines.   -reassured  -Sunscreen: Apply 20 minutes prior to going outdoors and reapply every two hours, when wet or sweating. We recommend using an SPF 30 or higher, and to use one that is water resistant.       # Benign nevus - chin, irritated. Plan for shave removal at another appointment - patient to schedule a separate visit  - Photo 5/9/22  -edu would be a shave removal, sent to path and that it is possible she could have a scar or that the mole could grow back    Procedures Performed:   none    Follow-up: 5 month(s) in-person, or earlier for new or changing lesions    Staff and Scribe:     Scribe Disclosure:  I, Halle Graham, am serving as a scribe to prep the notes for Kaley Pringle PA-C .    Provider Disclosure:   The documentation recorded by the scribe accurately reflects the services I personally performed and the decisions made by me.    All risks,  benefits and alternatives were discussed with patient.  Patient is in agreement and understands the assessment and plan.  All questions were answered.    Kaley Pringle PA-C, MPAS  [facility]: Phone: [phone], Fax: [phone]  [facility]: Phone: [phone],  Fax: [phone]  [facility]: Phone: [phone], Fax: [phone]  ____________________________________________    CC: No chief complaint on file.    HPI:  Ms. Ruiz Deng is a(n) 41 year old female who presents today as a return patient for spot check. Last seen by Dr. Lei at which point she was given hydroquinone 4% cream for treatment of melasma. She otherwise had a benign skin exam. Hx DN in the past. Notes today one brown spot above the L eyebrow as well as recommendations for thinning hair. Notes women have hair loss in her family. No sx on scalp. Additionally has bump on her chin which has been present a long time but slowly increasing in size. Would like removal if possible.     Patient is otherwise feeling well, without additional skin concerns.    Labs Reviewed:  N/A    Physical Exam:  Vitals: There were no vitals taken for this visit.  SKIN: Focused examination of face and neck was performed.  -4mm flesh colored and light brown papule on the R submental  - Scattered brown macules on sun exposed areas.  - No other lesions of concern on areas examined.           Medications:  Current Outpatient Medications   Medication     bimatoprost (LATISSE) 0.03 % external opthalmic solution     hydroquinone (BRYANNA) 4 % external cream     levonorgestrel (MIRENA) 20 MCG/24HR IUD     Current Facility-Administered Medications   Medication     levonorgestrel (MIRENA) 20 MCG/24HR IUD 20 mcg      Past Medical History:   Patient Active Problem List   Diagnosis     IUD Surveillance     CARDIOVASCULAR SCREENING; LDL GOAL LESS THAN 160      Skin blushing/flushing     Anxiety     Decreased libido     Olecranon bursitis of right elbow     Right-sided low back pain with right-sided sciatica, unspecified chronicity     Past Medical History:   Diagnosis Date     Abnormal Pap smear 9/17/2010    ASCUS negative HPV.  repeat pap in 9/2011 is NIL and per protocol, patient may resume routine screening.      Anxiety 6/22/2012     ASCUS favoring benign 12/16/14, 2010    neg HPV plan cotest in 3 yrs.     CARDIOVASCULAR SCREENING; LDL GOAL LESS THAN 160 10/31/2010           Again, thank you for allowing me to participate in the care of your patient.        Sincerely,        Kaley Pringle PA-C

## 2022-06-02 ENCOUNTER — TELEPHONE (OUTPATIENT)
Dept: DERMATOLOGY | Facility: CLINIC | Age: 42
End: 2022-06-02
Payer: COMMERCIAL

## 2022-06-02 NOTE — TELEPHONE ENCOUNTER
Return in about 1 year (around 9/2/2022) for Skin Check    [facility]   Dermatology, General and Plastic Surgery, Urology Specialties   [facility] [address] [address]

## 2022-11-10 NOTE — PROGRESS NOTES
Assessment & Plan     DUB (dysfunctional uterine bleeding)  The IUD strings are visible outside of the cervical os, we discussed checking labs today ( orders placed ) and also I have placed an order for a pelvic US to locate IUD position, since this is the first time in 12 years ( with a progesterone IUD ) that pt has had heavy vaginal bleeding   In DD could be IUD migration, vs hormonal changes , will check labs as below   - REVIEW OF HEALTH MAINTENANCE PROTOCOL ORDERS  - TSH with free T4 reflex; Future  - CBC with platelets; Future  - Pap diagnostic with HPV  - Wet prep - Clinic Collect  - NEISSERIA GONORRHOEA PCR  - CHLAMYDIA TRACHOMATIS PCR  - TSH with free T4 reflex  - CBC with platelets  - US Pelvic Complete with Transvaginal; Future      Follow up in one to two weeks sooner if any concerns   Pt is aware  and comfortable with the current plan.        Nakita Styles MD  [facility]    Cristela Melchor is a 42 year old, presenting for the following health issues:  Vaginal Bleeding      History of Present Illness       Reason for visit:  Vaginal bleeding  Symptom onset:  3-7 days ago  Symptoms include:  VaginL bleeding  Symptom intensity:  Mild  Symptom progression:  Improving  Had these symptoms before:  No  What makes it worse:  N/a  What makes it better:  N/a    She eats 4 or more servings of fruits and vegetables daily.She consumes 0 sweetened beverage(s) daily.She exercises with enough effort to increase her heart rate 30 to 60 minutes per day.  She exercises with enough effort to increase her heart rate 4 days per week.   She is taking medications regularly.   she is on a  Third Mirena IUD , two prior and this one is from August of 2020 ,   No periods not even spotting with all of IUDs in the past 12 yrs   DUB now, Started a week ago dark blood  then  heavy for a couple of days and now is better   No cramps some bloating , no pain, she does not check for strings of the IUD  "        Review of Systems   Constitutional, HEENT, cardiovascular, pulmonary, GI, , musculoskeletal, neuro, skin, endocrine and psych systems are negative, except as otherwise noted.      Objective    /70   Pulse 66   Temp 97.2  F (36.2  C) (Tympanic)   Resp 16   Ht 1.715 m (5' 7.5\")   Wt 70.6 kg (155 lb 11.2 oz)   LMP  (LMP Unknown)   SpO2 98%   BMI 24.03 kg/m    Body mass index is 24.03 kg/m .  Physical Exam   GENERAL: healthy, alert and no distress  EYES: Eyes grossly normal to inspection, PERRL and conjunctivae and sclerae normal  NECK: no adenopathy, no asymmetry, masses, or scars and thyroid normal to palpation  RESP: lungs clear to auscultation - no rales, rhonchi or wheezes  CV: regular rate and rhythm, normal S1 S2, no S3 or S4, no murmur, click or rub, no peripheral edema and peripheral pulses strong  ABDOMEN: soft, nontender, no hepatosplenomegaly, no masses and bowel sounds normal   (female): normal female external genitalia, normal urethral meatus, vaginal mucosa, normal cervix/adnexa/uterus without masses or discharge EXCEPT small amount of dark blood in the vaginal vault and also strings of the IUD are visible outside of the cervical os   MS: no gross musculoskeletal defects noted, no edema    Results for orders placed or performed in visit on 11/11/22   TSH with free T4 reflex     Status: Normal   Result Value Ref Range    TSH 1.41 0.40 - 4.00 mU/L   CBC with platelets     Status: Normal   Result Value Ref Range    WBC Count 6.3 4.0 - 11.0 10e3/uL    RBC Count 4.47 3.80 - 5.20 10e6/uL    Hemoglobin 13.8 11.7 - 15.7 g/dL    Hematocrit 42.6 35.0 - 47.0 %    MCV 95 78 - 100 fL    MCH 30.9 26.5 - 33.0 pg    MCHC 32.4 31.5 - 36.5 g/dL    RDW 13.1 10.0 - 15.0 %    Platelet Count 234 150 - 450 10e3/uL   Wet prep - Clinic Collect     Status: Abnormal    Specimen: Vagina; Swab   Result Value Ref Range    Trichomonas Absent Absent    Yeast Absent Absent    Clue Cells Absent Absent    " WBCs/high power field 1+ (A) None

## 2022-11-11 ENCOUNTER — OFFICE VISIT (OUTPATIENT)
Dept: FAMILY MEDICINE | Facility: CLINIC | Age: 42
End: 2022-11-11
Payer: COMMERCIAL

## 2022-11-11 VITALS
WEIGHT: 155.7 LBS | DIASTOLIC BLOOD PRESSURE: 70 MMHG | HEIGHT: 68 IN | TEMPERATURE: 97.2 F | BODY MASS INDEX: 23.6 KG/M2 | HEART RATE: 66 BPM | RESPIRATION RATE: 16 BRPM | OXYGEN SATURATION: 98 % | SYSTOLIC BLOOD PRESSURE: 106 MMHG

## 2022-11-11 DIAGNOSIS — N93.8 DUB (DYSFUNCTIONAL UTERINE BLEEDING): Primary | ICD-10-CM

## 2022-11-11 LAB
CLUE CELLS: ABNORMAL
ERYTHROCYTE [DISTWIDTH] IN BLOOD BY AUTOMATED COUNT: 13.1 % (ref 10–15)
HCT VFR BLD AUTO: 42.6 % (ref 35–47)
HGB BLD-MCNC: 13.8 G/DL (ref 11.7–15.7)
MCH RBC QN AUTO: 30.9 PG (ref 26.5–33)
MCHC RBC AUTO-ENTMCNC: 32.4 G/DL (ref 31.5–36.5)
MCV RBC AUTO: 95 FL (ref 78–100)
PLATELET # BLD AUTO: 234 10E3/UL (ref 150–450)
RBC # BLD AUTO: 4.47 10E6/UL (ref 3.8–5.2)
TRICHOMONAS, WET PREP: ABNORMAL
WBC # BLD AUTO: 6.3 10E3/UL (ref 4–11)
WBC'S/HIGH POWER FIELD, WET PREP: ABNORMAL
YEAST, WET PREP: ABNORMAL

## 2022-11-11 PROCEDURE — 88175 CYTOPATH C/V AUTO FLUID REDO: CPT | Performed by: FAMILY MEDICINE

## 2022-11-11 PROCEDURE — 87210 SMEAR WET MOUNT SALINE/INK: CPT | Performed by: FAMILY MEDICINE

## 2022-11-11 PROCEDURE — 85027 COMPLETE CBC AUTOMATED: CPT | Performed by: FAMILY MEDICINE

## 2022-11-11 PROCEDURE — 99214 OFFICE O/P EST MOD 30 MIN: CPT | Performed by: FAMILY MEDICINE

## 2022-11-11 PROCEDURE — 84443 ASSAY THYROID STIM HORMONE: CPT | Performed by: FAMILY MEDICINE

## 2022-11-11 PROCEDURE — 36415 COLL VENOUS BLD VENIPUNCTURE: CPT | Performed by: FAMILY MEDICINE

## 2022-11-11 PROCEDURE — 87591 N.GONORRHOEAE DNA AMP PROB: CPT | Performed by: FAMILY MEDICINE

## 2022-11-11 PROCEDURE — 87624 HPV HI-RISK TYP POOLED RSLT: CPT | Performed by: FAMILY MEDICINE

## 2022-11-11 PROCEDURE — 87491 CHLMYD TRACH DNA AMP PROBE: CPT | Performed by: FAMILY MEDICINE

## 2022-11-11 ASSESSMENT — PAIN SCALES - GENERAL: PAINLEVEL: NO PAIN (0)

## 2022-11-12 LAB — TSH SERPL DL<=0.005 MIU/L-ACNC: 1.41 MU/L (ref 0.4–4)

## 2022-11-15 LAB
C TRACH DNA SPEC QL NAA+PROBE: NEGATIVE
N GONORRHOEA DNA SPEC QL NAA+PROBE: NEGATIVE

## 2022-11-16 LAB
BKR LAB AP GYN ADEQUACY: NORMAL
BKR LAB AP GYN INTERPRETATION: NORMAL
BKR LAB AP HPV REFLEX: NORMAL
BKR LAB AP PREVIOUS ABNORMAL: NORMAL
PATH REPORT.COMMENTS IMP SPEC: NORMAL
PATH REPORT.COMMENTS IMP SPEC: NORMAL
PATH REPORT.RELEVANT HX SPEC: NORMAL

## 2023-01-14 ENCOUNTER — HEALTH MAINTENANCE LETTER (OUTPATIENT)
Age: 43
End: 2023-01-14

## 2023-01-26 ENCOUNTER — OFFICE VISIT (OUTPATIENT)
Dept: DERMATOLOGY | Facility: CLINIC | Age: 43
End: 2023-01-26
Payer: COMMERCIAL

## 2023-01-26 DIAGNOSIS — L82.1 SEBORRHEIC KERATOSES: ICD-10-CM

## 2023-01-26 DIAGNOSIS — Z86.018 HISTORY OF DYSPLASTIC NEVUS: Primary | ICD-10-CM

## 2023-01-26 PROCEDURE — 99214 OFFICE O/P EST MOD 30 MIN: CPT | Performed by: DERMATOLOGY

## 2023-01-26 ASSESSMENT — PAIN SCALES - GENERAL: PAINLEVEL: NO PAIN (0)

## 2023-01-26 NOTE — PATIENT INSTRUCTIONS
Wound Care After a Biopsy    What is a skin biopsy?  A skin biopsy allows the doctor to examine a very small piece of tissue under the microscope to determine the diagnosis and the best treatment for the skin condition. A local anesthetic (numbing medicine)  is injected with a very small needle into the skin area to be tested. A small piece of skin is taken from the area. Sometimes a suture (stitch) is used.     What are the risks of a skin biopsy?  I will experience scar, bleeding, swelling, pain, crusting and redness. I may experience incomplete removal or recurrence. Risks of this procedure are excessive bleeding, bruising, infection, nerve damage, numbness, thick (hypertrophic or keloidal) scar and non-diagnostic biopsy.    How should I care for my wound for the first 24 hours?  Keep the wound dry and covered for 24 hours  If it bleeds, hold direct pressure on the area for 15 minutes. If bleeding does not stop then go to the emergency room  Avoid strenuous exercise the first 1-2 days or as your doctor instructs you    How should I care for the wound after 24 hours?  After 24 hours, remove the bandage  You may bathe or shower as normal  If you had a scalp biopsy, you can shampoo as usual and can use shower water to clean the biopsy site daily  Clean the wound twice a day with gentle soap and water  Do not scrub, be gentle  Apply white petroleum/Vaseline after cleaning the wound with a cotton swab or a clean finger, and keep the site covered with a Bandaid /bandage. Bandages are not necessary with a scalp biopsy  If you are unable to cover the site with a Bandaid /bandage, re-apply ointment 2-3 times a day to keep the site moist. Moisture will help with healing  Avoid strenuous activity for first 1-2 days  Avoid lakes, rivers, pools, and oceans until the stitches are removed or the site is healed    How do I clean my wound?  Wash hands thoroughly with soap or use hand  before all wound care  Clean the  wound with gentle soap and water  Apply white petroleum/Vaseline  to wound after it is clean  Replace the Bandaid /bandage to keep the wound covered for the first few days or as instructed by your doctor  If you had a scalp biopsy, warm shower water to the area on a daily basis should suffice    What should I use to clean my wound?   Cotton-tipped applicators (Qtips )  White petroleum jelly (Vaseline ). Use a clean new container and use Q-tips to apply.  Bandaids   as needed  Gentle soap     How should I care for my wound long term?  Do not get your wound dirty  Keep up with wound care for one week or until the area is healed.  A small scab will form and fall off by itself when the area is completely healed. The area will be red and will become pink in color as it heals. Sun protection is very important for how your scar will turn out. Sunscreen with an SPF 30 or greater is recommended once the area is healed.  You should have some soreness but it should be mild and slowly go away over several days. Talk to your doctor about using tylenol for pain,    When should I call my doctor?  If you have increased:   Pain or swelling  Pus or drainage (clear or slightly yellow drainage is ok)  Temperature over 100F  Spreading redness or warmth around wound    When will I hear about my results?  The biopsy results can take 2 weeks to come back.  Your results will automatically release to [employer] before your provider has even reviewed them.  The clinic will call you with the results, send you a [employer] Watch message, or have you schedule a follow-up clinic or phone time to discuss the results.  Contact our clinics if you do not hear from us in 2 weeks.    Who should I call with questions?  [facility]: [phone]  [facility]: [phone]  For urgent needs outside of business hours call the [facility] at [phone] and ask for the dermatology resident on call    Patient Education     Checking for Skin Cancer  You can find cancer early by checking your skin each month. There are 3 kinds of skin cancer. They are melanoma, basal cell carcinoma, and squamous cell carcinoma. Doing monthly skin checks is the best way to find new marks or skin changes. Follow the instructions below for checking your skin.   The ABCDEs of checking moles for melanoma   Check your moles or growths for signs of melanoma using ABCDE:   Asymmetry: the sides of the mole or growth don t match  Border: the edges are ragged, notched, or blurred  Color: the color within the mole or growth varies  Diameter: the mole or growth is larger than 6 mm (size of a pencil eraser)  Evolving: the size, shape, or color of the mole or growth is changing (evolving is not shown in the images below)    Checking for other types of skin cancer  Basal cell carcinoma or squamous cell carcinoma have symptoms such as:     A spot or mole that looks different from all other marks on your skin  Changes in how an area feels, such as itching, tenderness, or pain  Changes in the skin's surface, such as oozing, bleeding, or scaliness  A sore that does not heal  New swelling or redness beyond the border of a mole    Who s at risk?  Anyone can get skin cancer. But you are at greater risk if you have:   Fair skin, light-colored hair, or light-colored eyes  Many moles or abnormal moles on your skin  A history of sunburns from sunlight or tanning beds  A family history of skin cancer  A history of exposure to radiation or chemicals  A weakened immune system  If you have had skin cancer in the past, you are at risk for recurring skin cancer.   How to check your skin  Do your monthly skin checkups in front of a full-length mirror. Check all parts of your body, including your:   Head (ears, face, neck, and scalp)  Torso (front, back, and sides)  Arms (tops, undersides, upper, and lower armpits)  Hands (palms, backs, and fingers, including  under the nails)  Buttocks and genitals  Legs (front, back, and sides)  Feet (tops, soles, toes, including under the nails, and between toes)  If you have a lot of moles, take digital photos of them each month. Make sure to take photos both up close and from a distance. These can help you see if any moles change over time.   Most skin changes are not cancer. But if you see any changes in your skin, call your doctor right away. Only he or she can diagnose a problem. If you have skin cancer, seeing your doctor can be the first step toward getting the treatment that could save your life.   [employer] last reviewed this educational content on 4/1/2019 2000-2020 [employer]. [address] [place] [address], [place] [address]. All rights reserved. This information is not intended as a substitute for professional medical care. Always follow your healthcare professional's instructions.       When should I call my doctor?  If you are worsening or not improving, please, contact us or seek urgent care as noted below.     Who should I call with questions (adults)?  [facility] (adult and pediatric): [phone]  [facility] (adult): [phone]  For urgent needs outside of business hours call the [facility] at [phone] and ask for the dermatology resident on call to be paged  If this is a medical emergency and you are unable to reach an ER, Call [phone]    Who should I call with questions (pediatric)?  [facility]- Pediatric Dermatology  Dr. Chapis Guerrero, Dr. Mylene Rivas, Dr. Thi Leonard, CRISTIANA Clarke, Dr. Kinza Rivero, Dr. Estella Johnson & Dr. Javi Martinez  Non-urgent nurse triage line; [phone]- Laverne and Ramona TIDWELL Care Coordinatorfarzad Barker (/Complex ) [phone]    If you need a prescription refill, please contact your pharmacy. Refills are approved or denied by our  Physicians during normal business hours, Monday through Fridays  Per office policy, refills will not be granted if you have not been seen within the past year (or sooner depending on your child's condition)    Scheduling Information:  Pediatric Appointment Scheduling and Call Center [phone]  Radiology Scheduling- [phone]  Sedation Unit Scheduling- [phone]  [place] Scheduling- General [phone]; Pediatric Dermatology [phone]  Main  Services: [phone]  [place]: [phone]  Nigerian: [phone]  Hmong/Turkish/Georgian: [phone]  Preadmission Nursing Department Fax Number: [phone] (Fax all pre-operative paperwork to this number)    For urgent matters arising during evenings, weekends, or holidays that cannot wait for normal business hours please call [phone] and ask for the dermatology resident on call to be paged.      Olay retinol  [employer] CE ferulic

## 2023-01-26 NOTE — PROGRESS NOTES
[facility] Dermatology Note  Encounter Date: Jan 26, 2023  Office Visit     Dermatology Problem List:  Last skin check 1/26/23, recommended yearly  1. Junctional dysplastic nevus with moderate atypia, right medial thigh, s/p bx 8/23/2016  2. Hypotrichosis:  - Current tx: latisse  3. 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  Family History: Negative for skin cancer.  ____________________________________________     ASSESSMENT/PLAN:     # History of DN- no reocurrence  - ABCDEs: Counseled ABCDEs of melanoma: Asymmetry, Border (irregularity), Color (not uniform, changes in color), Diameter (greater than 6 mm which is about the size of a pencil eraser), and Evolving (any changes in preexisting moles).  - Sun protection: Counseled SPF30+ sunscreen, UPF clothing, sun avoidance, tanning bed avoidance.   - Recommended regular skin checks.      # Seborrheic keratosis, non irritated on the trunk and extremities. Explained to patient benign nature of lesion. No treatment is necessary at this time unless the lesion changes or becomes symptomatic.     - Monitor for changes.      # Mild melasma. Chronic, stable  - Discussed possible triggers of hormones, sun and heat. Discouraged lasers and microneedling.   - Prescribed hydroquinone 4% cream to be used BID for 3 months on, 1 month off. Discussed risk of paradoxical hyperpigmentation with continuous use. Discussed that this is not covered by insurance. Discussed Goodrx.     # Hypotrichosis eyelashes: chronic, stable.   - Prescribed latisse to be used at bedtime. Start latisse once daily for 2 months apply to the base of the upper eyealshes only. Discussed risks of used of medication including eye color change(iris pigmentation), skin darkening at site of application. Patient denies a hx of eye disease. Patient is not pregnant or breast  feeding. Discussed that latisse results in a temporary improvement in lash length.   - Reviewed to use minimally given risk of orbital hollowing.    Procedures Performed:   None.    Follow-up: 1 year(s) in-person, or earlier for new or changing lesions    Staff and Scribe:     Scribe Disclosure:   I, George Enamorado, am serving as a scribe to document services personally performed by this physician, Dr. Patricia Leary, based on data collection and the provider's statements to me.       Provider Disclosure:   The documentation recorded by the scribe accurately reflects the services I personally performed and the decisions made by me.    Patricia Leary MD    Department of Dermatology  [facility]: Phone: [phone], Fax:[phone]  [facility]: Phone: [phone], Fax: [phone]    ____________________________________________    CC: No chief complaint on file.    HPI:  Ms. Ruiz Deng is a(n) 42 year old female who presents today as a return patient for a skin check.    Last seen 9/2/21 by Dr. Lei for a skin check. At that time, patient was instructed to apply hydroquinone 4% cream to the face for 3 months on and 1 month off for melasma.      Not pregnancy or breastfeeding    Wants refills    Patient is otherwise feeling well, without additional skin concerns.    Labs Reviewed:  N/A    Physical Exam:  Vitals: There were no vitals taken for this visit.  SKIN: Total skin excluding the undergarment areas was performed. The exam included the head/face, neck, both arms, chest, back, abdomen, both legs, digits and/or nails.   - There are waxy stuck on tan to brown papules on the trunk and extremities.    Tan patches, cheeks  -hypotrichosis eyelashes      - No other lesions of concern on areas examined.     Medications:  Current Outpatient Medications   Medication     levonorgestrel  (MIRENA) 20 MCG/24HR IUD     Current Facility-Administered Medications   Medication     levonorgestrel (MIRENA) 20 MCG/24HR IUD 20 mcg      Past Medical History:   Patient Active Problem List   Diagnosis     IUD Surveillance     CARDIOVASCULAR SCREENING; LDL GOAL LESS THAN 160     Skin blushing/flushing     Anxiety     Decreased libido     Olecranon bursitis of right elbow     Right-sided low back pain with right-sided sciatica, unspecified chronicity     Past Medical History:   Diagnosis Date     Abnormal Pap smear 9/17/2010    ASCUS negative HPV.  repeat pap in 9/2011 is NIL and per protocol, patient may resume routine screening.      Anxiety 6/22/2012     ASCUS favoring benign 12/16/14, 2010    neg HPV plan cotest in 3 yrs.     CARDIOVASCULAR SCREENING; LDL GOAL LESS THAN 160 10/31/2010        CC No referring provider defined for this encounter. on close of this encounter.

## 2023-01-26 NOTE — LETTER
1/26/2023         RE: Ruiz Deng  [address]  [facility] [address]        Dear Colleague,    Thank you for referring your patient, Ruiz Deng, to the [facility]. Please see a copy of my visit note below.    [facility] Dermatology Note  Encounter Date: Jan 26, 2023  Office Visit     Dermatology Problem List:  Last skin check 1/26/23, recommended yearly  1. Junctional dysplastic nevus with moderate atypia, right medial thigh, s/p bx 8/23/2016  2. Hypotrichosis:  - Current tx: latisse  3. 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  Family History: Negative for skin cancer.  ____________________________________________     ASSESSMENT/PLAN:     # History of DN- no reocurrence  - ABCDEs: Counseled ABCDEs of melanoma: Asymmetry, Border (irregularity), Color (not uniform, changes in color), Diameter (greater than 6 mm which is about the size of a pencil eraser), and Evolving (any changes in preexisting moles).  - Sun protection: Counseled SPF30+ sunscreen, UPF clothing, sun avoidance, tanning bed avoidance.   - Recommended regular skin checks.      # Seborrheic keratosis, non irritated on the trunk and extremities. Explained to patient benign nature of lesion. No treatment is necessary at this time unless the lesion changes or becomes symptomatic.     - Monitor for changes.      # Mild melasma. Chronic, stable  - Discussed possible triggers of hormones, sun and heat. Discouraged lasers and microneedling.   - Prescribed hydroquinone 4% cream to be used BID for 3 months on, 1 month off. Discussed risk of paradoxical hyperpigmentation with continuous use. Discussed that this is not covered by insurance. Discussed Goodrx.     # Hypotrichosis eyelashes: chronic, stable.   - Prescribed latisse to be used at bedtime. Start latisse once daily for 2  months apply to the base of the upper eyealshes only. Discussed risks of used of medication including eye color change(iris pigmentation), skin darkening at site of application. Patient denies a hx of eye disease. Patient is not pregnant or breast feeding. Discussed that latisse results in a temporary improvement in lash length.   - Reviewed to use minimally given risk of orbital hollowing.    Procedures Performed:   None.    Follow-up: 1 year(s) in-person, or earlier for new or changing lesions    Staff and Scribe:     Scribe Disclosure:   I, George Enamorado, am serving as a scribe to document services personally performed by this physician, Dr. Patricia Leary, based on data collection and the provider's statements to me.       Provider Disclosure:   The documentation recorded by the scribe accurately reflects the services I personally performed and the decisions made by me.    Patricia Leary MD    Department of Dermatology  [facility]: Phone: [phone], Fax:[phone]  [facility]: Phone: [phone], Fax: [phone]    ____________________________________________    CC: No chief complaint on file.    HPI:  Ms. Ruiz Deng is a(n) 42 year old female who presents today as a return patient for a skin check.    Last seen 9/2/21 by Dr. Lei for a skin check. At that time, patient was instructed to apply hydroquinone 4% cream to the face for 3 months on and 1 month off for melasma.      Not pregnancy or breastfeeding    Wants refills    Patient is otherwise feeling well, without additional skin concerns.    Labs Reviewed:  N/A    Physical Exam:  Vitals: There were no vitals taken for this visit.  SKIN: Total skin excluding the undergarment areas was performed. The exam included the head/face, neck, both arms, chest, back, abdomen, both legs, digits and/or nails.   - There are waxy  stuck on tan to brown papules on the trunk and extremities.    Tan patches, cheeks  -hypotrichosis eyelashes      - No other lesions of concern on areas examined.     Medications:  Current Outpatient Medications   Medication     levonorgestrel (MIRENA) 20 MCG/24HR IUD     Current Facility-Administered Medications   Medication     levonorgestrel (MIRENA) 20 MCG/24HR IUD 20 mcg      Past Medical History:   Patient Active Problem List   Diagnosis     IUD Surveillance     CARDIOVASCULAR SCREENING; LDL GOAL LESS THAN 160     Skin blushing/flushing     Anxiety     Decreased libido     Olecranon bursitis of right elbow     Right-sided low back pain with right-sided sciatica, unspecified chronicity     Past Medical History:   Diagnosis Date     Abnormal Pap smear 9/17/2010    ASCUS negative HPV.  repeat pap in 9/2011 is NIL and per protocol, patient may resume routine screening.      Anxiety 6/22/2012     ASCUS favoring benign 12/16/14, 2010    neg HPV plan cotest in 3 yrs.     CARDIOVASCULAR SCREENING; LDL GOAL LESS THAN 160 10/31/2010        CC No referring provider defined for this encounter. on close of this encounter.       Again, thank you for allowing me to participate in the care of your patient.        Sincerely,        Patricia Leary MD

## 2023-01-26 NOTE — NURSING NOTE
Ruiz Deng's chief complaint for this visit includes:  Chief Complaint   Patient presents with     Mole     Mole on right side of chin that she would like removed. Bothersome and feels its getting bigger.      PCP: Lida Navarrete    Referring Provider:  No referring provider defined for this encounter.    There were no vitals taken for this visit.  No Pain (0)      No Known Allergies      Do you need any medication refills at today's visit?  No.       Kasandra Guerra LPN

## 2023-02-03 ENCOUNTER — VIRTUAL VISIT (OUTPATIENT)
Dept: FAMILY MEDICINE | Facility: CLINIC | Age: 43
End: 2023-02-03
Payer: COMMERCIAL

## 2023-02-03 DIAGNOSIS — F40.243 FLYING PHOBIA: Primary | ICD-10-CM

## 2023-02-03 DIAGNOSIS — N93.8 DUB (DYSFUNCTIONAL UTERINE BLEEDING): ICD-10-CM

## 2023-02-03 PROCEDURE — 99214 OFFICE O/P EST MOD 30 MIN: CPT | Mod: GT | Performed by: FAMILY MEDICINE

## 2023-02-03 RX ORDER — LORAZEPAM 0.5 MG/1
TABLET ORAL
Qty: 10 TABLET | Refills: 0 | Status: SHIPPED | OUTPATIENT
Start: 2023-02-03

## 2023-02-03 RX ORDER — LORAZEPAM 1 MG/1
1 TABLET ORAL EVERY 6 HOURS PRN
COMMUNITY

## 2023-02-03 ASSESSMENT — ANXIETY QUESTIONNAIRES
1. FEELING NERVOUS, ANXIOUS, OR ON EDGE: SEVERAL DAYS
IF YOU CHECKED OFF ANY PROBLEMS ON THIS QUESTIONNAIRE, HOW DIFFICULT HAVE THESE PROBLEMS MADE IT FOR YOU TO DO YOUR WORK, TAKE CARE OF THINGS AT HOME, OR GET ALONG WITH OTHER PEOPLE: NOT DIFFICULT AT ALL
2. NOT BEING ABLE TO STOP OR CONTROL WORRYING: SEVERAL DAYS
8. IF YOU CHECKED OFF ANY PROBLEMS, HOW DIFFICULT HAVE THESE MADE IT FOR YOU TO DO YOUR WORK, TAKE CARE OF THINGS AT HOME, OR GET ALONG WITH OTHER PEOPLE?: NOT DIFFICULT AT ALL
GAD7 TOTAL SCORE: 6
GAD7 TOTAL SCORE: 6
3. WORRYING TOO MUCH ABOUT DIFFERENT THINGS: SEVERAL DAYS
GAD7 TOTAL SCORE: 6
6. BECOMING EASILY ANNOYED OR IRRITABLE: SEVERAL DAYS
7. FEELING AFRAID AS IF SOMETHING AWFUL MIGHT HAPPEN: SEVERAL DAYS
5. BEING SO RESTLESS THAT IT IS HARD TO SIT STILL: NOT AT ALL
7. FEELING AFRAID AS IF SOMETHING AWFUL MIGHT HAPPEN: SEVERAL DAYS
4. TROUBLE RELAXING: SEVERAL DAYS

## 2023-02-03 NOTE — PROGRESS NOTES
Ruiz is a 42 year old who is being evaluated via a billable video visit.      How would you like to obtain your AVS? MyChart  If the video visit is dropped, the invitation should be resent by: Text to cell phone: [phone]  Will anyone else be joining your video visit? No        Assessment & Plan     Flying phobia  Needs for flight anxiety , otherwise is doing well , will uses as needed for flights and soon will be flying to [place]   - LORazepam (ATIVAN) 0.5 MG tablet; One to two as needed before flight    DUB (dysfunctional uterine bleeding)  Had this back in November of 2022 and it has resolved , so did not do the US , if any re occurrence of the DUB or any more concerns , will do the US , labs done back then were all normal      RTC if no improving or worsening.  Pt is aware  and comfortable with the current plan.      Nakita Styles MD  [facility]   Ruiz is a 42 year old, presenting for the following health issues:  No chief complaint on file.  [facility] anxiety     History of Present Illness       Mental Health Follow-up:  Patient presents to follow-up on Anxiety.    Patient's anxiety since last visit has been:  Good  The patient is not having other symptoms associated with anxiety.  Any significant life events: No  Patient is not feeling anxious or having panic attacks.  Patient has no concerns about alcohol or drug use.    She eats 4 or more servings of fruits and vegetables daily.She consumes 0 sweetened beverage(s) daily.She exercises with enough effort to increase her heart rate 20 to 29 minutes per day.  She exercises with enough effort to increase her heart rate 3 or less days per week.   She is taking medications regularly.  Today's HECTOR-7 Score: 6             Review of Systems   Constitutional, HEENT, cardiovascular, pulmonary, GI, , musculoskeletal, neuro, skin, endocrine and psych systems are negative, except as otherwise noted.      Objective            Vitals:  No vitals were obtained today due to virtual visit.    Physical Exam   GENERAL: Healthy, alert and no distress  EYES: Eyes grossly normal to inspection.  No discharge or erythema, or obvious scleral/conjunctival abnormalities.  RESP: No audible wheeze, cough, or visible cyanosis.  No visible retractions or increased work of breathing.    SKIN: Visible skin clear. No significant rash, abnormal pigmentation or lesions.  NEURO: Cranial nerves grossly intact.  Mentation and speech appropriate for age.  PSYCH: Mentation appears normal, affect normal/bright, judgement and insight intact, normal speech and appearance well-groomed.    No results found for this or any previous visit (from the past 24 hour(s)).            Video-Visit Details    Type of service:  Video Visit     Originating Location (pt. Location): Home  Distant Location (provider location):  On-site  Platform used for Video Visit: Watson

## 2023-09-22 ENCOUNTER — MYC MEDICAL ADVICE (OUTPATIENT)
Dept: FAMILY MEDICINE | Facility: CLINIC | Age: 43
End: 2023-09-22
Payer: COMMERCIAL

## 2023-09-22 NOTE — TELEPHONE ENCOUNTER
Patient scheduled to see Dr. Styles on 10/31/2023 for a preventative visit. Routing to clinic to review and advise.     YAW Buckner  [facility] Primary Care Triage

## 2023-09-22 NOTE — TELEPHONE ENCOUNTER
"This question is addressed towards a different provider not me ! She says \" Thank you , Carolann \" so clearly it is not for me to answer it !  Needs to  be forwarded to that provider   Thanks   "

## 2023-10-26 ENCOUNTER — OFFICE VISIT (OUTPATIENT)
Dept: URGENT CARE | Facility: URGENT CARE | Age: 43
End: 2023-10-26
Payer: COMMERCIAL

## 2023-10-26 VITALS
DIASTOLIC BLOOD PRESSURE: 73 MMHG | WEIGHT: 141.9 LBS | RESPIRATION RATE: 16 BRPM | TEMPERATURE: 98.5 F | SYSTOLIC BLOOD PRESSURE: 105 MMHG | OXYGEN SATURATION: 99 % | HEART RATE: 82 BPM | HEIGHT: 68 IN | BODY MASS INDEX: 21.5 KG/M2

## 2023-10-26 DIAGNOSIS — R22.0 LEFT FACIAL SWELLING: Primary | ICD-10-CM

## 2023-10-26 PROCEDURE — 99214 OFFICE O/P EST MOD 30 MIN: CPT | Performed by: PHYSICIAN ASSISTANT

## 2023-10-26 ASSESSMENT — ENCOUNTER SYMPTOMS
EYE ITCHING: 0
ARTHRALGIAS: 0
EYE DISCHARGE: 0
EYES NEGATIVE: 1
DIARRHEA: 0
JOINT SWELLING: 0
DIZZINESS: 0
NECK STIFFNESS: 0
HEADACHES: 0
ALLERGIC/IMMUNOLOGIC NEGATIVE: 1
LIGHT-HEADEDNESS: 0
COUGH: 0
EYE PAIN: 0
WEAKNESS: 0
BACK PAIN: 0
PHOTOPHOBIA: 0
MUSCULOSKELETAL NEGATIVE: 1
RHINORRHEA: 0
FEVER: 0
CARDIOVASCULAR NEGATIVE: 1
ENDOCRINE NEGATIVE: 1
WOUND: 0
PALPITATIONS: 0
RESPIRATORY NEGATIVE: 1
CHILLS: 0
MYALGIAS: 0
NAUSEA: 0
SORE THROAT: 0
NECK PAIN: 0
SHORTNESS OF BREATH: 0
EYE REDNESS: 0
VOMITING: 0

## 2023-10-26 ASSESSMENT — PAIN SCALES - GENERAL: PAINLEVEL: NO PAIN (0)

## 2023-10-26 NOTE — PROGRESS NOTES
Chief Complaint:      Chief Complaint   Patient presents with    Swelling Around Left Eye     Medical Decision Making:    Differential Diagnosis:  Eye Problem: Stye (external)  Stye (internal)  Preseptal cellulitis  Blepharitis  Dacryocystitis  Sinuitis     ASSESSMENT     1. Left facial swelling       PLAN    Rx for Augmentin sent in for patient to cover for possible early dacryocystitis or early preseptal orbital cellulitis. Discussed with patient there is low concern of an issue within the eye due to no eye pain, no vision changes and no pain with eye movement. The antibiotic will cover for a possible infection of the soft tissue around the eye.  Worrisome symptoms discussed with instructions to go to the ED.  Patient verbalized understanding and agreed with this plan.    31 minutes was spent in the care of this patient including chart review, HPI, ROS, PE, review of plan, and placing of orders.      Labs:    No results found for any visits on 10/26/23.       Current Meds    Current Outpatient Medications:     amoxicillin-clavulanate (AUGMENTIN) 875-125 MG tablet, Take 1 tablet by mouth 2 times daily for 10 days, Disp: 20 tablet, Rfl: 0    levonorgestrel (MIRENA) 20 MCG/24HR IUD, 1 each (20 mcg) by Intrauterine route once for 1 dose, Disp: 1 each, Rfl: 0    LORazepam (ATIVAN) 0.5 MG tablet, One to two as needed before flight, Disp: 10 tablet, Rfl: 0    LORazepam (ATIVAN) 1 MG tablet, Take 1 mg by mouth every 6 hours as needed for anxiety (Patient not taking: Reported on 10/26/2023), Disp: , Rfl:     Current Facility-Administered Medications:     levonorgestrel (MIRENA) 20 MCG/24HR IUD 20 mcg, 1 each, Intrauterine, Once, Apurva Polanco, DO    Allergies  No Known Allergies      SUBJECTIVE    HPI: Ruiz Deng is a 43 year old female presenting with left eye edema, tenderness  for the past 1 day.  There has not been exposure to pink eye.  There has not been trauma to the eye.    Ruiz Deng does not wear  contact lenses.    No problems with vision, or eye pain. Pain is localized to the left nasal bridge, tenderness to palpation. Evidence of left upper cheek edema and edema near left nasolacrimal duct. No pain with eye movement, no discharge or drainage.     No recent viral illness or seasonal allergies.    ROS:     Review of Systems   Constitutional:  Negative for chills and fever.   HENT:  Negative for congestion, ear pain, rhinorrhea and sore throat.    Eyes: Negative.  Negative for photophobia, pain, discharge, redness, itching and visual disturbance.        Left eye swelling   Respiratory: Negative.  Negative for cough and shortness of breath.    Cardiovascular: Negative.  Negative for chest pain and palpitations.   Gastrointestinal:  Negative for diarrhea, nausea and vomiting.   Endocrine: Negative.    Genitourinary: Negative.    Musculoskeletal: Negative.  Negative for arthralgias, back pain, joint swelling, myalgias, neck pain and neck stiffness.   Skin: Negative.  Negative for rash and wound.   Allergic/Immunologic: Negative.  Negative for immunocompromised state.   Neurological:  Negative for dizziness, weakness, light-headedness and headaches.           Family History   Family History   Problem Relation Age of Onset    C.A.D. Paternal Grandfather 55         age 58 from 2nd MI    Diabetes Maternal Grandfather     Alcoholism Father     Pancreatic Cancer Father     Lung Cancer Paternal Grandmother 85        smoker    Colon Polyps Brother 35    Asthma No family hx of     Hypertension No family hx of     Cerebrovascular Disease No family hx of     Breast Cancer No family hx of     Cancer - colorectal No family hx of     Prostate Cancer No family hx of         Problem history  Patient Active Problem List   Diagnosis    IUD Surveillance    CARDIOVASCULAR SCREENING; LDL GOAL LESS THAN 160    Skin blushing/flushing    Anxiety    Decreased libido    Olecranon bursitis of right elbow    Right-sided low back pain  "with right-sided sciatica, unspecified chronicity           Social History  Social History     Socioeconomic History    Marital status:      Spouse name: Min    Number of children: 2    Years of education: Not on file    Highest education level: Not on file   Occupational History    Occupation: Nurse - [employer]     Employer: [employer]   Tobacco Use    Smoking status: Former     Types: Cigarettes    Smokeless tobacco: Never   Vaping Use    Vaping Use: Never used   Substance and Sexual Activity    Alcohol use: Yes     Comment: 2-3 drinks Q  weeks    Drug use: No    Sexual activity: Yes     Partners: Male     Birth control/protection: I.U.D.   Other Topics Concern    Parent/sibling w/ CABG, MI or angioplasty before 65F 55M? No     Service No    Blood Transfusions No    Caffeine Concern No    Occupational Exposure No    Hobby Hazards No    Sleep Concern No    Stress Concern No    Weight Concern No    Special Diet No    Back Care No    Exercise Yes     Comment: 3x per wk    Bike Helmet Not Asked     Comment: Pt doesn't ride a bike    Seat Belt Yes    Self-Exams No   Social History Narrative    ICU nurse at [employer], part-time          Social Determinants of Health     Financial Resource Strain: Not on file   Food Insecurity: Not on file   Transportation Needs: Not on file   Physical Activity: Not on file   Stress: Not on file   Social Connections: Not on file   Interpersonal Safety: Not on file   Housing Stability: Not on file        OBJECTIVE     Vital signs reviewed by Catracho Mcknight PA-C  /73   Pulse 82   Temp 98.5  F (36.9  C) (Tympanic)   Resp 16   Ht 1.727 m (5' 8\")   Wt 64.4 kg (141 lb 14.4 oz)   SpO2 99%   BMI 21.58 kg/m       Physical Exam  Vitals and nursing note reviewed.   Constitutional:       General: She is not in acute distress.     Appearance: She is well-developed. She is not ill-appearing, toxic-appearing or diaphoretic.   HENT:      Head: Normocephalic and " atraumatic.      Right Ear: Tympanic membrane, ear canal and external ear normal. No drainage, swelling or tenderness. Tympanic membrane is not perforated, erythematous, retracted or bulging.      Left Ear: Tympanic membrane, ear canal and external ear normal. No drainage, swelling or tenderness. Tympanic membrane is not perforated, erythematous, retracted or bulging.      Nose: No mucosal edema, congestion or rhinorrhea.      Right Sinus: No maxillary sinus tenderness or frontal sinus tenderness.      Left Sinus: No maxillary sinus tenderness or frontal sinus tenderness.      Mouth/Throat:      Pharynx: No pharyngeal swelling, oropharyngeal exudate, posterior oropharyngeal erythema or uvula swelling.      Tonsils: No tonsillar abscesses.   Eyes:      General: Lids are normal. Gaze aligned appropriately. No scleral icterus.        Right eye: No discharge or hordeolum.         Left eye: No discharge or hordeolum.      Extraocular Movements: Extraocular movements intact.      Conjunctiva/sclera: Conjunctivae normal.      Pupils: Pupils are equal, round, and reactive to light.      Comments: Left Eye: Swelling near the nasolacrimal duct and inferior to lower eyelid to upper left cheek. Tenderness to palpation of left nasal bridge. No erythema or overlying skin changes.   Neck:      Trachea: Trachea normal.   Cardiovascular:      Rate and Rhythm: Normal rate and regular rhythm.      Heart sounds: Normal heart sounds, S1 normal and S2 normal. No murmur heard.     No friction rub. No gallop.   Pulmonary:      Effort: Pulmonary effort is normal. No respiratory distress.      Breath sounds: Normal breath sounds. No decreased breath sounds, wheezing, rhonchi or rales.   Abdominal:      General: Bowel sounds are normal. There is no distension.      Palpations: Abdomen is soft. Abdomen is not rigid. There is no mass.      Tenderness: There is no abdominal tenderness. There is no guarding or rebound.   Musculoskeletal:       Cervical back: Full passive range of motion without pain, normal range of motion and neck supple.   Lymphadenopathy:      Cervical: No cervical adenopathy.   Skin:     General: Skin is warm and dry.   Neurological:      Mental Status: She is alert and oriented to person, place, and time.      Cranial Nerves: No cranial nerve deficit.      Deep Tendon Reflexes: Reflexes are normal and symmetric.   Psychiatric:         Behavior: Behavior normal. Behavior is cooperative.         Thought Content: Thought content normal.         Judgment: Judgment normal.            Catracho Mcknight PA-C  10/26/2023, 11:05 AM

## 2023-10-30 ASSESSMENT — ENCOUNTER SYMPTOMS
HEARTBURN: 0
SHORTNESS OF BREATH: 0
HEADACHES: 0
HEMATOCHEZIA: 0
PALPITATIONS: 0
CONSTIPATION: 0
FREQUENCY: 0
MYALGIAS: 0
NERVOUS/ANXIOUS: 1
COUGH: 0
HEMATURIA: 0
BREAST MASS: 0
DIZZINESS: 0
ABDOMINAL PAIN: 0
DYSURIA: 0
FEVER: 0
NAUSEA: 0
DIARRHEA: 0
EYE PAIN: 0
ARTHRALGIAS: 0
CHILLS: 0
PARESTHESIAS: 0
SORE THROAT: 0
WEAKNESS: 0
JOINT SWELLING: 0

## 2023-10-31 ENCOUNTER — OFFICE VISIT (OUTPATIENT)
Dept: FAMILY MEDICINE | Facility: CLINIC | Age: 43
End: 2023-10-31
Payer: COMMERCIAL

## 2023-10-31 VITALS
OXYGEN SATURATION: 100 % | SYSTOLIC BLOOD PRESSURE: 122 MMHG | DIASTOLIC BLOOD PRESSURE: 83 MMHG | HEIGHT: 68 IN | RESPIRATION RATE: 16 BRPM | HEART RATE: 67 BPM | TEMPERATURE: 98.4 F | WEIGHT: 145 LBS | BODY MASS INDEX: 21.98 KG/M2

## 2023-10-31 DIAGNOSIS — Z00.00 ROUTINE GENERAL MEDICAL EXAMINATION AT A HEALTH CARE FACILITY: Primary | ICD-10-CM

## 2023-10-31 PROCEDURE — 99396 PREV VISIT EST AGE 40-64: CPT | Performed by: FAMILY MEDICINE

## 2023-10-31 ASSESSMENT — ANXIETY QUESTIONNAIRES
GAD7 TOTAL SCORE: 11
4. TROUBLE RELAXING: SEVERAL DAYS
GAD7 TOTAL SCORE: 11
1. FEELING NERVOUS, ANXIOUS, OR ON EDGE: MORE THAN HALF THE DAYS
8. IF YOU CHECKED OFF ANY PROBLEMS, HOW DIFFICULT HAVE THESE MADE IT FOR YOU TO DO YOUR WORK, TAKE CARE OF THINGS AT HOME, OR GET ALONG WITH OTHER PEOPLE?: SOMEWHAT DIFFICULT
7. FEELING AFRAID AS IF SOMETHING AWFUL MIGHT HAPPEN: SEVERAL DAYS
GAD7 TOTAL SCORE: 11
5. BEING SO RESTLESS THAT IT IS HARD TO SIT STILL: SEVERAL DAYS
2. NOT BEING ABLE TO STOP OR CONTROL WORRYING: MORE THAN HALF THE DAYS
6. BECOMING EASILY ANNOYED OR IRRITABLE: MORE THAN HALF THE DAYS
IF YOU CHECKED OFF ANY PROBLEMS ON THIS QUESTIONNAIRE, HOW DIFFICULT HAVE THESE PROBLEMS MADE IT FOR YOU TO DO YOUR WORK, TAKE CARE OF THINGS AT HOME, OR GET ALONG WITH OTHER PEOPLE: SOMEWHAT DIFFICULT
3. WORRYING TOO MUCH ABOUT DIFFERENT THINGS: MORE THAN HALF THE DAYS
7. FEELING AFRAID AS IF SOMETHING AWFUL MIGHT HAPPEN: SEVERAL DAYS

## 2023-10-31 ASSESSMENT — ENCOUNTER SYMPTOMS
EYE PAIN: 0
HEARTBURN: 0
NAUSEA: 0
NERVOUS/ANXIOUS: 1
DYSURIA: 0
DIARRHEA: 0
ARTHRALGIAS: 0
HEMATOCHEZIA: 0
HEADACHES: 0
DIZZINESS: 0
MYALGIAS: 0
ABDOMINAL PAIN: 0
COUGH: 0
SORE THROAT: 0
FEVER: 0
JOINT SWELLING: 0
CONSTIPATION: 0
WEAKNESS: 0
PARESTHESIAS: 0
HEMATURIA: 0
BREAST MASS: 0
SHORTNESS OF BREATH: 0
PALPITATIONS: 0
FREQUENCY: 0
CHILLS: 0

## 2023-10-31 ASSESSMENT — PATIENT HEALTH QUESTIONNAIRE - PHQ9
SUM OF ALL RESPONSES TO PHQ QUESTIONS 1-9: 0
10. IF YOU CHECKED OFF ANY PROBLEMS, HOW DIFFICULT HAVE THESE PROBLEMS MADE IT FOR YOU TO DO YOUR WORK, TAKE CARE OF THINGS AT HOME, OR GET ALONG WITH OTHER PEOPLE: NOT DIFFICULT AT ALL
SUM OF ALL RESPONSES TO PHQ QUESTIONS 1-9: 0

## 2023-10-31 ASSESSMENT — PAIN SCALES - GENERAL: PAINLEVEL: NO PAIN (0)

## 2023-10-31 NOTE — PROGRESS NOTES
SUBJECTIVE:   CC: Ruiz is an 43 year old who presents for preventive health visit.       10/31/2023    10:50 AM   Additional Questions   Roomed by Jevon HERNÁNDEZ       Healthy Habits:     Getting at least 3 servings of Calcium per day:  Yes    Bi-annual eye exam:  Yes    Dental care twice a year:  Yes    Sleep apnea or symptoms of sleep apnea:  None    Diet:  Regular (no restrictions)    Frequency of exercise:  2-3 days/week    Duration of exercise:  15-30 minutes    Taking medications regularly:  Yes    Medication side effects:  None    Additional concerns today:  Yes  Mirena IUD and normally no period August of 2020 was inserted     Takes Ozempic , prescribed through weight loss clinic has been since April, doing well on it, no side effects , helps with food cravings , she has control over food noise in her head         Social History     Tobacco Use    Smoking status: Former     Types: Cigarettes    Smokeless tobacco: Never   Substance Use Topics    Alcohol use: Yes     Comment: 2-3 drinks Q  weeks             10/30/2023     5:55 PM   Alcohol Use   Prescreen: >3 drinks/day or >7 drinks/week? No     Reviewed orders with patient.  Reviewed health maintenance and updated orders accordingly - Yes  Lab work is in process  Labs reviewed in EPIC  BP Readings from Last 3 Encounters:   10/31/23 122/83   10/26/23 105/73   11/11/22 106/70    Wt Readings from Last 3 Encounters:   10/31/23 65.8 kg (145 lb)   10/26/23 64.4 kg (141 lb 14.4 oz)   11/11/22 70.6 kg (155 lb 11.2 oz)                  Patient Active Problem List   Diagnosis    IUD Surveillance    CARDIOVASCULAR SCREENING; LDL GOAL LESS THAN 160    Skin blushing/flushing    Anxiety    Decreased libido    Olecranon bursitis of right elbow    Right-sided low back pain with right-sided sciatica, unspecified chronicity     Past Surgical History:   Procedure Laterality Date    RECONSTRUCT BREAST, IMPLANT PROSTHESIS, COMBINED  5/ 2011    SURGICAL HISTORY OF -   9/2014     mucocele removal    TONSILLECTOMY  2002    [facility] APPENDECTOMY  2006    open       Social History     Tobacco Use    Smoking status: Former     Types: Cigarettes    Smokeless tobacco: Never   Substance Use Topics    Alcohol use: Yes     Comment: 2-3 drinks Q  weeks     Family History   Problem Relation Age of Onset    C.A.D. Paternal Grandfather 55         age 58 from 2nd MI    Diabetes Maternal Grandfather     Alcoholism Father     Pancreatic Cancer Father     Lung Cancer Paternal Grandmother 85        smoker    Colon Polyps Brother 35    Asthma No family hx of     Hypertension No family hx of     Cerebrovascular Disease No family hx of     Breast Cancer No family hx of     Cancer - colorectal No family hx of     Prostate Cancer No family hx of          Current Outpatient Medications   Medication Sig Dispense Refill    amoxicillin-clavulanate (AUGMENTIN) 875-125 MG tablet Take 1 tablet by mouth 2 times daily for 10 days 20 tablet 0    LORazepam (ATIVAN) 0.5 MG tablet One to two as needed before flight 10 tablet 0    LORazepam (ATIVAN) 1 MG tablet Take 1 mg by mouth every 6 hours as needed for anxiety      Semaglutide (OZEMPIC, 0.25 OR 0.5 MG/DOSE, SC)       levonorgestrel (MIRENA) 20 MCG/24HR IUD 1 each (20 mcg) by Intrauterine route once for 1 dose 1 each 0     No Known Allergies  Recent Labs   Lab Test 22  1522 20  0913 17  0804   LDL  --  86 66   HDL  --  92 76   TRIG  --  128 48   TSH 1.41  --   --         Breast Cancer Screening:    FHS-7:        No data to display                Mammogram Screening - Offered annual screening and updated Health Maintenance for mutual plan based on risk factor consideration  Pertinent mammograms are reviewed under the imaging tab.    History of abnormal Pap smear: NO - age 30- 65 PAP every 3 years recommended      Latest Ref Rng & Units 2022     3:05 PM 2021     1:37 PM 2018    12:48 PM   PAP / HPV   PAP  Negative for Intraepithelial Lesion  or Malignancy (NILM)  Negative for Intraepithelial Lesion or Malignancy (NILM)     HPV 16 DNA Negative Negative  Negative  Negative    HPV 18 DNA Negative Negative  Negative  Negative    Other HR HPV Negative Negative  Negative  Negative      Reviewed and updated as needed this visit by clinical staff                  Reviewed and updated as needed this visit by Provider                 Past Medical History:   Diagnosis Date    Abnormal Pap smear 9/17/2010    ASCUS negative HPV.  repeat pap in 9/2011 is NIL and per protocol, patient may resume routine screening.     Anxiety 6/22/2012    ASCUS favoring benign 12/16/14, 2010    neg HPV plan cotest in 3 yrs.    CARDIOVASCULAR SCREENING; LDL GOAL LESS THAN 160 10/31/2010      Past Surgical History:   Procedure Laterality Date    RECONSTRUCT BREAST, IMPLANT PROSTHESIS, COMBINED  5/ 2011    SURGICAL HISTORY OF -   9/2014    mucocele removal    TONSILLECTOMY  2002    [facility] APPENDECTOMY  2006    open       Review of Systems   Constitutional:  Negative for chills and fever.   HENT:  Negative for congestion, ear pain, hearing loss and sore throat.    Eyes:  Negative for pain and visual disturbance.   Respiratory:  Negative for cough and shortness of breath.    Cardiovascular:  Negative for chest pain, palpitations and peripheral edema.   Gastrointestinal:  Negative for abdominal pain, constipation, diarrhea, heartburn, hematochezia and nausea.   Breasts:  Negative for tenderness, breast mass and discharge.   Genitourinary:  Negative for dysuria, frequency, genital sores, hematuria, pelvic pain, urgency, vaginal bleeding and vaginal discharge.   Musculoskeletal:  Negative for arthralgias, joint swelling and myalgias.   Skin:  Negative for rash.   Neurological:  Negative for dizziness, weakness, headaches and paresthesias.   Psychiatric/Behavioral:  Negative for mood changes. The patient is nervous/anxious.      CONSTITUTIONAL: NEGATIVE for fever, chills, change in  weight  INTEGUMENTARU/SKIN: NEGATIVE for worrisome rashes, moles or lesions  EYES: NEGATIVE for vision changes or irritation  ENT: NEGATIVE for ear, mouth and throat problems  RESP: NEGATIVE for significant cough or SOB  BREAST: NEGATIVE for masses, tenderness or discharge  CV: NEGATIVE for chest pain, palpitations or peripheral edema  GI: NEGATIVE for nausea, abdominal pain, heartburn, or change in bowel habits  : NEGATIVE for unusual urinary or vaginal symptoms. Periods are regular.  MUSCULOSKELETAL: NEGATIVE for significant arthralgias or myalgia  NEURO: NEGATIVE for weakness, dizziness or paresthesias  PSYCHIATRIC: NEGATIVE for changes in mood or affect     OBJECTIVE:   There were no vitals taken for this visit.  Physical Exam  GENERAL: healthy, alert and no distress  EYES: Eyes grossly normal to inspection, PERRL and conjunctivae and sclerae normal  HENT: ear canals and TM's normal, nose and mouth without ulcers or lesions  NECK: no adenopathy, no asymmetry, masses, or scars and thyroid normal to palpation  RESP: lungs clear to auscultation - no rales, rhonchi or wheezes  BREAST: normal without masses, tenderness or nipple discharge and no palpable axillary masses or adenopathy  CV: regular rate and rhythm, normal S1 S2, no S3 or S4, no murmur, click or rub, no peripheral edema and peripheral pulses strong  ABDOMEN: soft, nontender, no hepatosplenomegaly, no masses and bowel sounds normal  MS: no gross musculoskeletal defects noted, no edema  SKIN: no suspicious lesions or rashes  NEURO: Normal strength and tone, mentation intact and speech normal  PSYCH: mentation appears normal, affect normal/bright    Diagnostic Test Results:  Labs reviewed in Epic  No results found for this or any previous visit (from the past 24 hour(s)).    ASSESSMENT/PLAN:   (Z00.00) Routine general medical examination at a health care facility  (primary encounter diagnosis)  Comment: Discussed diet,calcium,exercise.Went over self  breast exam.Thin prep was NOT done.Eyes and teeth UTD.No immunizations needed today.See orders below for tests ordered and screening needed.    Plan: PRIMARY CARE FOLLOW-UP SCHEDULING, REVIEW OF         HEALTH MAINTENANCE PROTOCOL ORDERS, Lipid panel        reflex to direct LDL Fasting, TSH with free T4         reflex, Comprehensive metabolic panel (BMP +         Alb, Alk Phos, ALT, AST, Total. Bili, TP)        Will do labs as above when she is fasting     Patient has been advised of split billing requirements and indicates understanding: Yes      COUNSELING:  Reviewed preventive health counseling, as reflected in patient instructions       Regular exercise       Healthy diet/nutrition       Vision screening       Contraception        She reports that she has quit smoking. Her smoking use included cigarettes. She has never used smokeless tobacco.        Nakita Styles MD  [facility]  Answers submitted by the patient for this visit:  Patient Health Questionnaire (Submitted on 10/31/2023)  If you checked off any problems, how difficult have these problems made it for you to do your work, take care of things at home, or get along with other people?: Not difficult at all  PHQ9 TOTAL SCORE: 0  HECTOR-7 (Submitted on 10/31/2023)  HECTOR 7 TOTAL SCORE: 11

## 2023-11-13 ENCOUNTER — LAB (OUTPATIENT)
Dept: LAB | Facility: CLINIC | Age: 43
End: 2023-11-13
Payer: COMMERCIAL

## 2023-11-13 DIAGNOSIS — Z00.00 ROUTINE GENERAL MEDICAL EXAMINATION AT A HEALTH CARE FACILITY: ICD-10-CM

## 2023-11-13 LAB
ALBUMIN SERPL BCG-MCNC: 4.8 G/DL (ref 3.5–5.2)
ALP SERPL-CCNC: 47 U/L (ref 35–104)
ALT SERPL W P-5'-P-CCNC: 11 U/L (ref 0–50)
ANION GAP SERPL CALCULATED.3IONS-SCNC: 9 MMOL/L (ref 7–15)
AST SERPL W P-5'-P-CCNC: 25 U/L (ref 0–45)
BILIRUB SERPL-MCNC: 0.6 MG/DL
BUN SERPL-MCNC: 8.9 MG/DL (ref 6–20)
CALCIUM SERPL-MCNC: 9.1 MG/DL (ref 8.6–10)
CHLORIDE SERPL-SCNC: 104 MMOL/L (ref 98–107)
CHOLEST SERPL-MCNC: 180 MG/DL
CREAT SERPL-MCNC: 0.64 MG/DL (ref 0.51–0.95)
DEPRECATED HCO3 PLAS-SCNC: 27 MMOL/L (ref 22–29)
EGFRCR SERPLBLD CKD-EPI 2021: >90 ML/MIN/1.73M2
GLUCOSE SERPL-MCNC: 90 MG/DL (ref 70–99)
HDLC SERPL-MCNC: 67 MG/DL
LDLC SERPL CALC-MCNC: 102 MG/DL
NONHDLC SERPL-MCNC: 113 MG/DL
POTASSIUM SERPL-SCNC: 4.5 MMOL/L (ref 3.4–5.3)
PROT SERPL-MCNC: 7.2 G/DL (ref 6.4–8.3)
SODIUM SERPL-SCNC: 140 MMOL/L (ref 135–145)
TRIGL SERPL-MCNC: 55 MG/DL
TSH SERPL DL<=0.005 MIU/L-ACNC: 0.95 UIU/ML (ref 0.3–4.2)

## 2023-11-13 PROCEDURE — 80053 COMPREHEN METABOLIC PANEL: CPT

## 2023-11-13 PROCEDURE — 84443 ASSAY THYROID STIM HORMONE: CPT

## 2023-11-13 PROCEDURE — 80061 LIPID PANEL: CPT

## 2023-11-13 PROCEDURE — 36415 COLL VENOUS BLD VENIPUNCTURE: CPT

## 2024-10-01 ENCOUNTER — PATIENT OUTREACH (OUTPATIENT)
Dept: CARE COORDINATION | Facility: CLINIC | Age: 44
End: 2024-10-01
Payer: COMMERCIAL

## 2024-10-15 ENCOUNTER — PATIENT OUTREACH (OUTPATIENT)
Dept: CARE COORDINATION | Facility: CLINIC | Age: 44
End: 2024-10-15
Payer: COMMERCIAL

## 2024-12-11 ENCOUNTER — OFFICE VISIT (OUTPATIENT)
Dept: FAMILY MEDICINE | Facility: CLINIC | Age: 44
End: 2024-12-11
Payer: COMMERCIAL

## 2024-12-11 VITALS
SYSTOLIC BLOOD PRESSURE: 110 MMHG | RESPIRATION RATE: 16 BRPM | HEIGHT: 68 IN | OXYGEN SATURATION: 100 % | TEMPERATURE: 96.9 F | DIASTOLIC BLOOD PRESSURE: 75 MMHG | BODY MASS INDEX: 20.99 KG/M2 | WEIGHT: 138.5 LBS | HEART RATE: 72 BPM

## 2024-12-11 DIAGNOSIS — Z00.00 ENCOUNTER FOR ROUTINE ADULT HEALTH EXAMINATION WITHOUT ABNORMAL FINDINGS: Primary | ICD-10-CM

## 2024-12-11 PROCEDURE — 99396 PREV VISIT EST AGE 40-64: CPT | Performed by: FAMILY MEDICINE

## 2024-12-11 SDOH — HEALTH STABILITY: PHYSICAL HEALTH: ON AVERAGE, HOW MANY DAYS PER WEEK DO YOU ENGAGE IN MODERATE TO STRENUOUS EXERCISE (LIKE A BRISK WALK)?: 2 DAYS

## 2024-12-11 SDOH — HEALTH STABILITY: PHYSICAL HEALTH: ON AVERAGE, HOW MANY MINUTES DO YOU ENGAGE IN EXERCISE AT THIS LEVEL?: 30 MIN

## 2024-12-11 ASSESSMENT — ANXIETY QUESTIONNAIRES
IF YOU CHECKED OFF ANY PROBLEMS ON THIS QUESTIONNAIRE, HOW DIFFICULT HAVE THESE PROBLEMS MADE IT FOR YOU TO DO YOUR WORK, TAKE CARE OF THINGS AT HOME, OR GET ALONG WITH OTHER PEOPLE: VERY DIFFICULT
5. BEING SO RESTLESS THAT IT IS HARD TO SIT STILL: SEVERAL DAYS
7. FEELING AFRAID AS IF SOMETHING AWFUL MIGHT HAPPEN: NEARLY EVERY DAY
7. FEELING AFRAID AS IF SOMETHING AWFUL MIGHT HAPPEN: NEARLY EVERY DAY
4. TROUBLE RELAXING: MORE THAN HALF THE DAYS
3. WORRYING TOO MUCH ABOUT DIFFERENT THINGS: NEARLY EVERY DAY
8. IF YOU CHECKED OFF ANY PROBLEMS, HOW DIFFICULT HAVE THESE MADE IT FOR YOU TO DO YOUR WORK, TAKE CARE OF THINGS AT HOME, OR GET ALONG WITH OTHER PEOPLE?: VERY DIFFICULT
6. BECOMING EASILY ANNOYED OR IRRITABLE: NEARLY EVERY DAY
1. FEELING NERVOUS, ANXIOUS, OR ON EDGE: NEARLY EVERY DAY
2. NOT BEING ABLE TO STOP OR CONTROL WORRYING: NEARLY EVERY DAY
GAD7 TOTAL SCORE: 18

## 2024-12-11 ASSESSMENT — PAIN SCALES - GENERAL: PAINLEVEL_OUTOF10: NO PAIN (0)

## 2024-12-11 ASSESSMENT — SOCIAL DETERMINANTS OF HEALTH (SDOH): HOW OFTEN DO YOU GET TOGETHER WITH FRIENDS OR RELATIVES?: ONCE A WEEK

## 2024-12-11 NOTE — PROGRESS NOTES
Preventive Care Visit  [facility]  Nakita Styles MD, Family Medicine  Dec 11, 2024      Assessment & Plan     Encounter for routine adult health examination without abnormal findings  Discussed diet,calcium,exercise.Went over self breast exam.Thin prep was NOT done.Eyes and teeth UTD.No immunizations needed today.See orders below for tests ordered and screening needed.    - REVIEW OF HEALTH MAINTENANCE PROTOCOL ORDERS    Patient has been advised of split billing requirements and indicates understanding: Yes        Counseling  Appropriate preventive services were addressed with this patient via screening, questionnaire, or discussion as appropriate for fall prevention, nutrition, physical activity, Tobacco-use cessation, social engagement, weight loss and cognition.  Checklist reviewing preventive services available has been given to the patient.  Reviewed patient's diet, addressing concerns and/or questions.   She is at risk for lack of exercise and has been provided with information to increase physical activity for the benefit of her well-being.   She is at risk for psychosocial distress and has been provided with information to reduce risk.   The patient's PHQ-9 score is consistent with mild depression. She was provided with information regarding depression.           Cristela Melchor is a 44 year old, presenting for the following:  Physical        12/11/2024     9:43 AM   Additional Questions   Roomed by Jevon HERNÁNDEZ        Answers submitted by the patient for this visit:  Patient Health Questionnaire (Submitted on 12/11/2024)  If you checked off any problems, how difficult have these problems made it for you to do your work, take care of things at home, or get along with other people?: Somewhat difficult  PHQ9 TOTAL SCORE: 6  Patient Health Questionnaire (G7) (Submitted on 12/11/2024)  HECTOR 7 TOTAL SCORE: 18    HPI  No concerns           Health Care Directive  Patient does not have a Health  Care Directive:       12/11/2024   General Health   How would you rate your overall physical health? Good   Feel stress (tense, anxious, or unable to sleep) Rather much      (!) STRESS CONCERN      12/11/2024   Nutrition   Three or more servings of calcium each day? Yes   Diet: Regular (no restrictions)   How many servings of fruit and vegetables per day? (!) 0-1   How many sweetened beverages each day? 0-1            12/11/2024   Exercise   Days per week of moderate/strenous exercise 2 days   Average minutes spent exercising at this level 30 min      (!) EXERCISE CONCERN      12/11/2024   Social Factors   Frequency of gathering with friends or relatives Once a week   Worry food won't last until get money to buy more No   Food not last or not have enough money for food? No   Do you have housing? (Housing is defined as stable permanent housing and does not include staying ouside in a car, in a tent, in an abandoned building, in an overnight shelter, or couch-surfing.) Yes   Are you worried about losing your housing? No   Lack of transportation? No   Unable to get utilities (heat,electricity)? No            12/11/2024   Dental   Dentist two times every year? Yes            12/11/2024   TB Screening   Were you born outside of the [place]? No          Today's PHQ-9 Score:       12/11/2024     8:21 AM   PHQ-9 SCORE   PHQ-9 Total Score MyChart 6 (Mild depression)   PHQ-9 Total Score 6        Patient-reported         12/11/2024   Substance Use   Alcohol more than 3/day or more than 7/wk No   Do you use any other substances recreationally? (!) CANNABIS PRODUCTS        Social History     Tobacco Use    Smoking status: Former     Current packs/day: 0.00     Types: Cigarettes    Smokeless tobacco: Never   Vaping Use    Vaping status: Never Used   Substance Use Topics    Alcohol use: Yes     Comment: 2-3 drinks Q  weeks    Drug use: No          Mammogram Screening - Mammogram every 1-2 years updated in Health Maintenance based on  mutual decision making        12/11/2024   STI Screening   New sexual partner(s) since last STI/HIV test? No        History of abnormal Pap smear: No - age 30-64 HPV with reflex Pap every 5 years recommended        Latest Ref Rng & Units 11/11/2022     3:05 PM 8/16/2021     1:37 PM 12/19/2018    12:48 PM   PAP / HPV   PAP  Negative for Intraepithelial Lesion or Malignancy (NILM)  Negative for Intraepithelial Lesion or Malignancy (NILM)     HPV 16 DNA Negative Negative  Negative  Negative    HPV 18 DNA Negative Negative  Negative  Negative    Other HR HPV Negative Negative  Negative  Negative      ASCVD Risk   The ASCVD Risk score (José Luis CASTAÑEDA, et al., 2019) failed to calculate for the following reasons:    The systolic blood pressure is missing        12/11/2024   Contraception/Family Planning   Questions about contraception or family planning No           Reviewed and updated as needed this visit by Provider                    Past Medical History:   Diagnosis Date    Abnormal Pap smear 9/17/2010    ASCUS negative HPV.  repeat pap in 9/2011 is NIL and per protocol, patient may resume routine screening.     Anxiety 6/22/2012    ASCUS favoring benign 12/16/14, 2010    neg HPV plan cotest in 3 yrs.    CARDIOVASCULAR SCREENING; LDL GOAL LESS THAN 160 10/31/2010     Past Surgical History:   Procedure Laterality Date    IR LUMBAR PUNCTURE  5/19/2022    IR LUMBAR PUNCTURE  6/8/2022    RECONSTRUCT BREAST, IMPLANT PROSTHESIS, COMBINED  5/ 2011    SURGICAL HISTORY OF -   9/2014    mucocele removal    TONSILLECTOMY  2002    ZC APPENDECTOMY  2006    open     Lab work is in process  Labs reviewed in EPIC  BP Readings from Last 3 Encounters:   12/11/24 110/75   10/31/23 122/83   10/26/23 105/73    Wt Readings from Last 3 Encounters:   12/11/24 62.8 kg (138 lb 8 oz)   10/31/23 65.8 kg (145 lb)   10/26/23 64.4 kg (141 lb 14.4 oz)                  Patient Active Problem List   Diagnosis    IUD Surveillance    CARDIOVASCULAR  SCREENING; LDL GOAL LESS THAN 160    Skin blushing/flushing    Anxiety    Decreased libido    Olecranon bursitis of right elbow    Right-sided low back pain with right-sided sciatica, unspecified chronicity     Past Surgical History:   Procedure Laterality Date    IR LUMBAR PUNCTURE  2022    IR LUMBAR PUNCTURE  2022    RECONSTRUCT BREAST, IMPLANT PROSTHESIS, COMBINED  2011    SURGICAL HISTORY OF -   2014    mucocele removal    TONSILLECTOMY  2002    Z APPENDECTOMY  2006    open       Social History     Tobacco Use    Smoking status: Former     Current packs/day: 0.00     Types: Cigarettes    Smokeless tobacco: Never   Substance Use Topics    Alcohol use: Yes     Comment: 2-3 drinks Q  weeks     Family History   Problem Relation Age of Onset    C.A.D. Paternal Grandfather 55         age 58 from 2nd MI    Diabetes Maternal Grandfather     Alcoholism Father     Pancreatic Cancer Father     Lung Cancer Paternal Grandmother 85        smoker    Colon Polyps Brother 35    Asthma No family hx of     Hypertension No family hx of     Cerebrovascular Disease No family hx of     Breast Cancer No family hx of     Cancer - colorectal No family hx of     Prostate Cancer No family hx of          Current Outpatient Medications   Medication Sig Dispense Refill    LORazepam (ATIVAN) 0.5 MG tablet One to two as needed before flight 10 tablet 0    LORazepam (ATIVAN) 1 MG tablet Take 1 mg by mouth every 6 hours as needed for anxiety      Semaglutide (OZEMPIC, 0.25 OR 0.5 MG/DOSE, SC)       levonorgestrel (MIRENA) 20 MCG/24HR IUD 1 each (20 mcg) by Intrauterine route once for 1 dose 1 each 0     No Known Allergies  Recent Labs   Lab Test 23  1047 22  1522 20  0913 17  0804   *  --  86 66   HDL 67  --  92 76   TRIG 55  --  128 48   ALT 11  --   --   --    CR 0.64  --   --   --    GFRESTIMATED >90  --   --   --    POTASSIUM 4.5  --   --   --    TSH 0.95 1.41  --   --           Review of  "Systems  Constitutional, HEENT, cardiovascular, pulmonary, GI, , musculoskeletal, neuro, skin, endocrine and psych systems are negative, except as otherwise noted.     Objective    Exam  There were no vitals taken for this visit.   Estimated body mass index is 22.38 kg/m  as calculated from the following:    Height as of 10/31/23: 1.715 m (5' 7.5\").    Weight as of 10/31/23: 65.8 kg (145 lb).    Physical Exam  GENERAL: alert and no distress  EYES: Eyes grossly normal to inspection, PERRL and conjunctivae and sclerae normal  HENT: ear canals and TM's normal, nose and mouth without ulcers or lesions  NECK: no adenopathy, no asymmetry, masses, or scars  RESP: lungs clear to auscultation - no rales, rhonchi or wheezes  BREAST: normal without masses, tenderness or nipple discharge and no palpable axillary masses or adenopathy  CV: regular rate and rhythm, normal S1 S2, no S3 or S4, no murmur, click or rub, no peripheral edema  ABDOMEN: soft, nontender, no hepatosplenomegaly, no masses and bowel sounds normal  MS: no gross musculoskeletal defects noted, no edema  SKIN: no suspicious lesions or rashes  NEURO: Normal strength and tone, mentation intact and speech normal  PSYCH: mentation appears normal, affect normal/bright        Signed Electronically by: Nakita Styles MD    "

## 2025-01-24 ENCOUNTER — MYC MEDICAL ADVICE (OUTPATIENT)
Dept: FAMILY MEDICINE | Facility: CLINIC | Age: 45
End: 2025-01-24
Payer: COMMERCIAL

## 2025-01-24 ENCOUNTER — MYC REFILL (OUTPATIENT)
Dept: FAMILY MEDICINE | Facility: CLINIC | Age: 45
End: 2025-01-24
Payer: COMMERCIAL

## 2025-01-24 DIAGNOSIS — F40.243 FLYING PHOBIA: Primary | ICD-10-CM

## 2025-01-24 DIAGNOSIS — F40.243 FLYING PHOBIA: ICD-10-CM

## 2025-01-27 RX ORDER — LORAZEPAM 1 MG/1
1 TABLET ORAL EVERY 6 HOURS PRN
Qty: 10 TABLET | Refills: 0 | Status: SHIPPED | OUTPATIENT
Start: 2025-01-27

## 2025-01-27 NOTE — TELEPHONE ENCOUNTER
Routing refill request to provider for review/approval because:  Drug not on the [facility] refill protocol  refill request for lorazepam    Hazel Houston RN   [facility]